# Patient Record
Sex: MALE | Race: WHITE | NOT HISPANIC OR LATINO | Employment: FULL TIME | URBAN - NONMETROPOLITAN AREA
[De-identification: names, ages, dates, MRNs, and addresses within clinical notes are randomized per-mention and may not be internally consistent; named-entity substitution may affect disease eponyms.]

---

## 2018-02-08 ENCOUNTER — OFFICE VISIT (OUTPATIENT)
Dept: MEDICAL GROUP | Facility: PHYSICIAN GROUP | Age: 53
End: 2018-02-08
Payer: COMMERCIAL

## 2018-02-08 VITALS
RESPIRATION RATE: 12 BRPM | HEIGHT: 68 IN | OXYGEN SATURATION: 97 % | TEMPERATURE: 98 F | DIASTOLIC BLOOD PRESSURE: 80 MMHG | HEART RATE: 90 BPM | WEIGHT: 219 LBS | SYSTOLIC BLOOD PRESSURE: 138 MMHG | BODY MASS INDEX: 33.19 KG/M2

## 2018-02-08 DIAGNOSIS — Z12.11 SCREEN FOR COLON CANCER: ICD-10-CM

## 2018-02-08 DIAGNOSIS — Z80.0 FAMILY HISTORY OF ESOPHAGEAL CANCER: ICD-10-CM

## 2018-02-08 DIAGNOSIS — E66.9 OBESITY (BMI 30-39.9): ICD-10-CM

## 2018-02-08 DIAGNOSIS — D17.1 LIPOMA OF BACK: ICD-10-CM

## 2018-02-08 DIAGNOSIS — Z13.220 SCREENING FOR LIPID DISORDERS: ICD-10-CM

## 2018-02-08 DIAGNOSIS — K21.9 CHRONIC GERD: ICD-10-CM

## 2018-02-08 DIAGNOSIS — R00.2 PALPITATION: Primary | ICD-10-CM

## 2018-02-08 PROCEDURE — 99204 OFFICE O/P NEW MOD 45 MIN: CPT | Performed by: NURSE PRACTITIONER

## 2018-02-08 RX ORDER — CHOLECALCIFEROL (VITAMIN D3) 25 MCG
TABLET,CHEWABLE ORAL
COMMUNITY

## 2018-02-08 RX ORDER — MULTIVIT WITH MINERALS/LUTEIN
TABLET ORAL
COMMUNITY

## 2018-02-08 ASSESSMENT — PATIENT HEALTH QUESTIONNAIRE - PHQ9: CLINICAL INTERPRETATION OF PHQ2 SCORE: 0

## 2018-02-08 NOTE — ASSESSMENT & PLAN NOTE
Patient has a large lipoma on the left upper back, this does not cause him pain, it has not increased in size. He may discuss with general surgery if he wishes to have it surgically removed. If it increases in size recommend US.

## 2018-02-08 NOTE — ASSESSMENT & PLAN NOTE
Patient is a 52-year-old male here to establish care. He complains of palpitations in the evening, episodes generally last more than 30 minutes. They're not accompanied with chest pain, shortness of breath, syncope. He does have some anxiety as his brother was recently diagnosed with esophageal cancer. He denies any association between his anxiety and palpitation episodes. He does not drink alcohol. He does not smoke. He does drink one cup of coffee a day, occasionally iced tea, caffeine does not seem to be an aggravating factor. We did discuss that these are likely benign PVCs, trial metoprolol 25 mg at night. Patient is quite anxious about this and would like to move forward with workup. We will inform EKG, check TSH, CBC, electrolytes. Rule out structural heart disease with echo. 48 hour Holter monitor ordered. Follow-up in one month, sooner if needed.

## 2018-02-08 NOTE — PROGRESS NOTES
[facility]  Primary Care Office Visit - Problem-Oriented        History:     Vitaly Paulino is a 52 y.o. male who is here today to discuss Hypertension (est care, wants screened for diabetes) and Back Pain (lump on back)      Family history of esophageal cancer  As previously noted, patient is a 52-year-old male who is new to me and here to establish care. He does have a family history of esophageal cancer in both his father and brother. He is no longer drinking alcohol or smoking. He continues on omeprazole 20 mg twice a day. He has had no odynophagia, dysphagia, hoarseness of the voice or change in the voice.     Palpitation  Patient is a 52-year-old male here to establish care. He complains of palpitations in the evening, episodes generally last more than 30 minutes. They're not accompanied with chest pain, shortness of breath, syncope. He does have some anxiety as his brother was recently diagnosed with esophageal cancer. He denies any association between his anxiety and palpitation episodes. He does not drink alcohol. He does not smoke. He does drink one cup of coffee a day, occasionally iced tea, caffeine does not seem to be an aggravating factor. We did discuss that these are likely benign PVCs, trial metoprolol 25 mg at night. Patient is quite anxious about this and would like to move forward with workup. We will inform EKG, check TSH, CBC, electrolytes. Rule out structural heart disease with echo. 48 hour Holter monitor ordered. Follow-up in one month, sooner if needed.    Chronic GERD  Patient is a 52-year-old male with chronic GERD treated with omeprazole 20 mg twice a day. He is no longer smoking or drinking alcohol. He does have a family history of esophageal cancer in both his brother and father. We did discuss the major risk factors for esophageal cancer, very unlikely that there is a familial component as though he is at risk for Julian's esophagus due to chronic GERD and we will refer him  to general surgery for both EGD and colonoscopy.     Lipoma of back  Patient has a large lipoma on the left upper back, this does not cause him pain, it has not increased in size. He may discuss with general surgery if he wishes to have it surgically removed. If it increases in size recommend US.         History reviewed. No pertinent past medical history.  History reviewed. No pertinent surgical history.  Social History     Social History   • Marital status:      Spouse name: N/A   • Number of children: N/A   • Years of education: N/A     Occupational History   • Not on file.     Social History Main Topics   • Smoking status: Former Smoker     Packs/day: 1.00     Years: 20.00     Types: Cigarettes     Quit date: 2/8/2006   • Smokeless tobacco: Former User   • Alcohol use No   • Drug use: No   • Sexual activity: Not on file     Other Topics Concern   • Not on file     Social History Narrative   • No narrative on file     History   Smoking Status   • Former Smoker   • Packs/day: 1.00   • Years: 20.00   • Types: Cigarettes   • Quit date: 2/8/2006   Smokeless Tobacco   • Former User     Family History   Problem Relation Age of Onset   • Cancer Mother      BCA    • Cancer Father      esophageal and stomach    • Cancer Brother      barretts, esophageal CA @ 61     No Known Allergies    Problem List:     Patient Active Problem List    Diagnosis Date Noted   • Family history of esophageal cancer 02/08/2018   • Palpitation 02/08/2018   • Obesity (BMI 30-39.9) 02/08/2018   • Chronic GERD 02/08/2018   • Lipoma of back 02/08/2018         Medications:     Current Outpatient Prescriptions:   •  aspirin 81 MG tablet, Take 81 mg by mouth every day., Disp: , Rfl:   •  Cyanocobalamin (B-12) 2500 MCG Tab, Take  by mouth., Disp: , Rfl:   •  Ascorbic Acid (VITAMIN C) 1000 MG Tab, Take  by mouth., Disp: , Rfl:   •  Lansoprazole (PREVACID PO), Take  by mouth., Disp: , Rfl:   •  metoprolol (LOPRESSOR) 25 MG Tab, Take 1 Tab by mouth  "2 times a day., Disp: 60 Tab, Rfl: 0  •  Multiple Vitamins-Minerals (MULTIVITAMIN PO), Take  by mouth., Disp: , Rfl:       Review of Systems:     Pertinent positives as per HPI, all other systems reviewed and WNL     Physical Assessment:     VS: /80   Pulse 90   Temp 36.7 °C (98 °F)   Resp 12   Ht 1.715 m (5' 7.5\")   Wt 99.3 kg (219 lb)   SpO2 97%   BMI 33.79 kg/m²     General: Well-developed, well-nourished male, obese     Head: PERRL, EOMI. Normocephalic. No facial asymmetry noted.  Cardiovasc: RRR, no MRG. No thrills or bruits. Pulses 2+ and symmetric at all distal extremities.  Pulmonary: Lungs clear bilaterally.  Normal respiratory effort. No wheeze or crackles.   Neuro: Alert and oriented, CNs II-XII intact, no focal deficits.  Skin: Large lipoma noted on left upper back, roughly the size of a golf ball. No rashes noted. Skin warm, dry, intact    Psych: Dressed appropriately for the weather, pleasant and conversant.  Affect, mood & judgment appropriate.  Assessment/Plan:   Vitaly was seen today for hypertension and back pain.    Diagnoses and all orders for this visit:    Palpitation, uncontrolled   - Reassurance provided, likely PVCs, trial metoprolol 25 mg at night, may use during the day if experiencing symptomatic episodes. We will move forward with workup as below. He will follow up in one month, sooner if needed. ER precautions discussed.  -     ECHOCARDIOGRAM COMP W/O CONT; Future  -     TSH; Future  -     FREE THYROXINE; Future  -     CBC WITH DIFFERENTIAL; Future  -     COMP METABOLIC PANEL; Future  -     EKG; Future - NSR, no ectopy   -     metoprolol (LOPRESSOR) 25 MG Tab; Take 1 Tab by mouth 2 times a day.  -     HOLTER - Cardiology Performed (48HR); Future    Family history of esophageal cancer  - discussed referral to gastro, patient declines, will discuss with Dr. Khurram rubin surgery to see if EGD is appropriate at the time of colonoscopy.     Chronic GERD, stable on daily PPI  - " discussed referral to gastro, patient declines, will discuss with Dr. Paulson gen surgery to see if EGD is appropriate at the time of colonoscopy.     Screen colon cancer  - referral for colonoscopy - Dr. Paulson     Screening for lipid disorders  -     LIPID PROFILE; Future    Obesity (BMI 30-39.9)  -     Patient identified as having weight management issue.  Appropriate orders and counseling given.    Lipoma of back, stable, continue to monitor, discuss with general surgery       Patient is agreeable to the above plan and voiced understanding. All questions answered.     Please note that this dictation was created using voice recognition software. I have made every reasonable attempt to correct obvious errors, but I expect that there are errors of grammar and possibly content that I did not discover before finalizing the note.      VITO Morris  2/8/2018, 10:36 AM

## 2018-02-08 NOTE — PATIENT INSTRUCTIONS
Metoprolol extended-release tablets  What is this medicine?  METOPROLOL (me TOE proe lole) is a beta-blocker. Beta-blockers reduce the workload on the heart and help it to beat more regularly. This medicine is used to treat high blood pressure and to prevent chest pain. It is also used to after a heart attack and to prevent an additional heart attack from occurring.  This medicine may be used for other purposes; ask your health care provider or pharmacist if you have questions.  COMMON BRAND NAME(S): Toprol XL  What should I tell my health care provider before I take this medicine?  They need to know if you have any of these conditions:  -diabetes  -heart or vessel disease like slow heart rate, worsening heart failure, heart block, sick sinus syndrome or Raynaud's disease  -kidney disease  -liver disease  -lung or breathing disease, like asthma or emphysema  -pheochromocytoma  -thyroid disease  -an unusual or allergic reaction to metoprolol, other beta-blockers, medicines, foods, dyes, or preservatives  -pregnant or trying to get pregnant  -breast-feeding  How should I use this medicine?  Take this medicine by mouth with a glass of water. Follow the directions on the prescription label. Do not crush or chew. Take this medicine with or immediately after meals. Take your doses at regular intervals. Do not take more medicine than directed. Do not stop taking this medicine suddenly. This could lead to serious heart-related effects.  Talk to your pediatrician regarding the use of this medicine in children. While this drug may be prescribed for children as young as 6 years for selected conditions, precautions do apply.  Overdosage: If you think you have taken too much of this medicine contact a poison control center or emergency room at once.  NOTE: This medicine is only for you. Do not share this medicine with others.  What if I miss a dose?  If you miss a dose, take it as soon as you can. If it is almost time for your  next dose, take only that dose. Do not take double or extra doses.  What may interact with this medicine?  Do not take this medicine with any of the following medications:  -sotalol  This medicine may also interact with the following medications:  -clonidine  -digoxin  -dobutamine  -epinephrine  -isoproterenol  -medicine to control heart rhythm like quinidine, propafenone  -medicine for depression like monoamine oxidase (MAO) inhibitors, fluoxetine, and paroxetine  -medicine for blood pressure like calcium channel blockers  -reserpine  This list may not describe all possible interactions. Give your health care provider a list of all the medicines, herbs, non-prescription drugs, or dietary supplements you use. Also tell them if you smoke, drink alcohol, or use illegal drugs. Some items may interact with your medicine.  What should I watch for while using this medicine?  Visit your doctor or health care professional for regular check ups. Contact your doctor right away if your symptoms worsen. Check your blood pressure and pulse rate regularly. Ask your health care professional what your blood pressure and pulse rate should be, and when you should contact them.  You may get drowsy or dizzy. Do not drive, use machinery, or do anything that needs mental alertness until you know how this medicine affects you. Do not sit or stand up quickly, especially if you are an older patient. This reduces the risk of dizzy or fainting spells. Contact your doctor if these symptoms continue. Alcohol may interfere with the effect of this medicine. Avoid alcoholic drinks.  What side effects may I notice from receiving this medicine?  Side effects that you should report to your doctor or health care professional as soon as possible:  -allergic reactions like skin rash, itching or hives  -cold or numb hands or feet  -depression  -difficulty breathing  -faint  -fever with sore throat  -irregular heartbeat, chest pain  -rapid weight  gain  -swollen legs or ankles  Side effects that usually do not require medical attention (report to your doctor or health care professional if they continue or are bothersome):  -anxiety or nervousness  -change in sex drive or performance  -dry skin  -headache  -nightmares or trouble sleeping  -short term memory loss  -stomach upset or diarrhea  -unusually tired  This list may not describe all possible side effects. Call your doctor for medical advice about side effects. You may report side effects to FDA at 5-775-DZH-2224.  Where should I keep my medicine?  Keep out of the reach of children.  Store at room temperature between 15 and 30 degrees C (59 and 86 degrees F). Throw away any unused medicine after the expiration date.  NOTE: This sheet is a summary. It may not cover all possible information. If you have questions about this medicine, talk to your doctor, pharmacist, or health care provider.  © 2014, [employer]. (2/27/2009 5:08:10 PM)

## 2018-02-08 NOTE — ASSESSMENT & PLAN NOTE
As previously noted, patient is a 52-year-old male who is new to me and here to establish care. He does have a family history of esophageal cancer in both his father and brother. He is no longer drinking alcohol or smoking. He continues on omeprazole 20 mg twice a day. He has had no odynophagia, dysphagia, hoarseness of the voice or change in the voice.

## 2018-02-08 NOTE — ASSESSMENT & PLAN NOTE
Patient is a 52-year-old male with chronic GERD treated with omeprazole 20 mg twice a day. He is no longer smoking or drinking alcohol. He does have a family history of esophageal cancer in both his brother and father. We did discuss the major risk factors for esophageal cancer, very unlikely that there is a familial component as though he is at risk for Julian's esophagus due to chronic GERD and we will refer him to general surgery for both EGD and colonoscopy.

## 2018-02-21 ENCOUNTER — TELEPHONE (OUTPATIENT)
Dept: MEDICAL GROUP | Facility: PHYSICIAN GROUP | Age: 53
End: 2018-02-21

## 2018-02-21 NOTE — TELEPHONE ENCOUNTER
Called [facility] for a prior auth for an Echo to be completed at [facility]. They will be sending a fax for more information. Tracking number is [id number]. We will fax papers back to [phone]

## 2018-03-08 ENCOUNTER — OFFICE VISIT (OUTPATIENT)
Dept: MEDICAL GROUP | Facility: PHYSICIAN GROUP | Age: 53
End: 2018-03-08
Payer: COMMERCIAL

## 2018-03-08 VITALS
HEART RATE: 87 BPM | BODY MASS INDEX: 33.1 KG/M2 | TEMPERATURE: 97.8 F | SYSTOLIC BLOOD PRESSURE: 110 MMHG | HEIGHT: 68 IN | RESPIRATION RATE: 16 BRPM | DIASTOLIC BLOOD PRESSURE: 60 MMHG | WEIGHT: 218.4 LBS | OXYGEN SATURATION: 97 %

## 2018-03-08 DIAGNOSIS — Z23 NEED FOR VACCINATION: ICD-10-CM

## 2018-03-08 DIAGNOSIS — R00.2 PALPITATION: ICD-10-CM

## 2018-03-08 DIAGNOSIS — Z80.0 FAMILY HISTORY OF ESOPHAGEAL CANCER: ICD-10-CM

## 2018-03-08 DIAGNOSIS — K21.9 CHRONIC GERD: ICD-10-CM

## 2018-03-08 PROCEDURE — 90715 TDAP VACCINE 7 YRS/> IM: CPT | Performed by: NURSE PRACTITIONER

## 2018-03-08 PROCEDURE — 90471 IMMUNIZATION ADMIN: CPT | Performed by: NURSE PRACTITIONER

## 2018-03-08 PROCEDURE — 99214 OFFICE O/P EST MOD 30 MIN: CPT | Mod: 25 | Performed by: NURSE PRACTITIONER

## 2018-03-08 NOTE — PROGRESS NOTES
[facility]  Primary Care Office Visit - Problem-Oriented        History:     Vitaly Paulino is a 52 y.o. male who is here today to discuss Hypertension ([facility] labs)      Palpitation  Patient is a 52-year-old male with ongoing palpitations. Echo was reviewed with the patient today and heart structures are normal, PVCs were captured during the exam. He was started on metoprolol 25 mg twice daily at our last office visit, he does not take this during the day as the palpitations do not seem to be bothersome during the day. He takes 25 mg at night as this is when they are most bothersome, he does report that his episodes seem less frequent and less prolonged. EKG, TSH, CBC, electrolytes were normal. We will have him trial 50 mg nightly, monitoring his heart rate and pulse, follow-up parameters were discussed. Should his symptoms remain stable and 50 mg metoprolol nightly he will follow-up in 6 months.    Patient has colonoscopy and EGD scheduled on 3/15/18 - Dr. Paulson       History reviewed. No pertinent past medical history.  History reviewed. No pertinent surgical history.  Social History     Social History   • Marital status:      Spouse name: N/A   • Number of children: N/A   • Years of education: N/A     Occupational History   • Not on file.     Social History Main Topics   • Smoking status: Former Smoker     Packs/day: 1.00     Years: 20.00     Types: Cigarettes     Quit date: 2/8/2006   • Smokeless tobacco: Former User   • Alcohol use No   • Drug use: No   • Sexual activity: Not on file     Other Topics Concern   • Not on file     Social History Narrative   • No narrative on file     History   Smoking Status   • Former Smoker   • Packs/day: 1.00   • Years: 20.00   • Types: Cigarettes   • Quit date: 2/8/2006   Smokeless Tobacco   • Former User     Family History   Problem Relation Age of Onset   • Cancer Mother      BCA    • Cancer Father      esophageal and stomach    • Cancer Brother      barretts,  "esophageal CA @ 61     No Known Allergies    Problem List:     Patient Active Problem List    Diagnosis Date Noted   • Family history of esophageal cancer 02/08/2018   • Palpitation 02/08/2018   • Obesity (BMI 30-39.9) 02/08/2018   • Chronic GERD 02/08/2018   • Lipoma of back 02/08/2018         Medications:     Current Outpatient Prescriptions:   •  aspirin 81 MG tablet, Take 81 mg by mouth every day., Disp: , Rfl:   •  Cyanocobalamin (B-12) 2500 MCG Tab, Take  by mouth., Disp: , Rfl:   •  Ascorbic Acid (VITAMIN C) 1000 MG Tab, Take  by mouth., Disp: , Rfl:   •  Lansoprazole (PREVACID PO), Take  by mouth., Disp: , Rfl:   •  metoprolol (LOPRESSOR) 25 MG Tab, Take 1 Tab by mouth 2 times a day., Disp: 60 Tab, Rfl: 0  •  Multiple Vitamins-Minerals (MULTIVITAMIN PO), Take  by mouth., Disp: , Rfl:       Review of Systems:     Pertinent positives as per HPI, all other systems reviewed and WNL    Physical Assessment:     VS: /60   Pulse 87   Temp 36.6 °C (97.8 °F)   Resp 16   Ht 1.715 m (5' 7.52\")   Wt 99.1 kg (218 lb 6.4 oz)   SpO2 97%   BMI 33.68 kg/m²     General: Well-developed, well-nourished, male, obese body habitus     Head: PERRL, EOMI. Normocephalic. No facial asymmetry noted.  Cardiovasc: RRR, no MRG. No thrills or bruits. Pulses 2+ and symmetric at all distal extremities.  Pulmonary: Lungs clear bilaterally.  Normal respiratory effort. No wheeze or crackles.   Neuro: Alert and oriented  Skin:No rashes noted. Skin warm, dry, intact    Psych: Dressed appropriately for the weather, pleasant and conversant.  Affect, mood & judgment appropriate.    Assessment/Plan:   Vitaly was seen today for hypertension.    Diagnoses and all orders for this visit:    Palpitation, ongoing, improved   - Reviewed echo and labs with the patient, reassurance provided, benign PVCs, increase metoprolol to 50 mg nightly, follow-up in 6 months, sooner if symptoms remain uncontrolled with increase BB.    Need for vaccination  -   "   TDAP VACCINE =>8YO IM      Patient is agreeable to the above plan and voiced understanding. All questions answered.     Please note that this dictation was created using voice recognition software. I have made every reasonable attempt to correct obvious errors, but I expect that there are errors of grammar and possibly content that I did not discover before finalizing the note.      VITO Morris  3/8/2018, 9:26 AM

## 2018-03-08 NOTE — ASSESSMENT & PLAN NOTE
Patient is a 52-year-old male with ongoing palpitations. Echo was reviewed with the patient today and heart structures are normal, PVCs were captured during the exam. He was started on metoprolol 25 mg twice daily at our last office visit, he does not take this during the day as the palpitations do not seem to be bothersome during the day. He takes 25 mg at night as this is when they are most bothersome, he does report that his episodes seem less frequent and less prolonged. EKG, TSH, CBC, electrolytes were normal. We will have him trial 50 mg nightly, monitoring his heart rate and pulse, follow-up parameters were discussed. Should his symptoms remain stable and 50 mg metoprolol nightly he will follow-up in 6 months.

## 2018-03-19 DIAGNOSIS — R00.2 PALPITATION: ICD-10-CM

## 2018-03-20 NOTE — TELEPHONE ENCOUNTER
Refill X 6 months, sent to pharmacy.Pt. Seen in the last 6 months per protocol. CMP scanned in from [facility] (3/18) and [facility].

## 2018-03-20 NOTE — TELEPHONE ENCOUNTER
Was the patient seen in the last year in this department? Yes     Does patient have an active prescription for medications requested? No     Received Request Via: Pharmacy      Pt met protocol?: Yes pt last ov 3/18   BP Readings from Last 1 Encounters:   03/08/18 110/60

## 2018-06-04 ENCOUNTER — PATIENT MESSAGE (OUTPATIENT)
Dept: MEDICAL GROUP | Facility: PHYSICIAN GROUP | Age: 53
End: 2018-06-04

## 2018-06-04 DIAGNOSIS — R00.2 PALPITATION: ICD-10-CM

## 2018-06-05 RX ORDER — METOPROLOL TARTRATE 50 MG/1
50 TABLET, FILM COATED ORAL 2 TIMES DAILY
Qty: 60 TAB | Refills: 2 | Status: SHIPPED | OUTPATIENT
Start: 2018-06-05 | End: 2018-09-10

## 2018-09-10 ENCOUNTER — OFFICE VISIT (OUTPATIENT)
Dept: MEDICAL GROUP | Facility: PHYSICIAN GROUP | Age: 53
End: 2018-09-10
Payer: COMMERCIAL

## 2018-09-10 VITALS
HEART RATE: 68 BPM | DIASTOLIC BLOOD PRESSURE: 74 MMHG | OXYGEN SATURATION: 97 % | RESPIRATION RATE: 16 BRPM | TEMPERATURE: 98.5 F | HEIGHT: 68 IN | SYSTOLIC BLOOD PRESSURE: 122 MMHG | WEIGHT: 215 LBS | BODY MASS INDEX: 32.58 KG/M2

## 2018-09-10 DIAGNOSIS — D17.1 LIPOMA OF BACK: ICD-10-CM

## 2018-09-10 DIAGNOSIS — E66.9 OBESITY (BMI 30-39.9): ICD-10-CM

## 2018-09-10 DIAGNOSIS — R00.2 PALPITATION: ICD-10-CM

## 2018-09-10 PROCEDURE — 99214 OFFICE O/P EST MOD 30 MIN: CPT | Performed by: NURSE PRACTITIONER

## 2018-09-10 RX ORDER — METOPROLOL SUCCINATE 100 MG/1
100 TABLET, EXTENDED RELEASE ORAL DAILY
Qty: 90 TAB | Refills: 1 | Status: SHIPPED | OUTPATIENT
Start: 2018-09-10 | End: 2018-12-19 | Stop reason: SDUPTHER

## 2018-09-10 NOTE — PROGRESS NOTES
[facility]  Primary Care Office Visit - Problem-Oriented        History:     Vitaly Paulino is a 53 y.o. male who is here today to discuss Medication Refill      Palpitation  Patient is a 53-year-old male with palpitations.  Echo was performed and normal.  PVCs were captured during the exam.  TSH, CBC, CMP, EKG were also normal.  He continues on metoprolol 50 mg twice daily.  He does report that in the early evening before bed he does have mild episodes of palpitations every other night.  He has no syncope, shortness of breath, chest pain with the episodes.  His wife accompanies the visit today, she denies witnessed apneic episodes, snoring.  Patient denies feeling tired when he wakes, symptoms to suggest underlying obstructive sleep apnea.  Overall he feels well, declines referral to cardiology at this time.    Lipoma of back  Patient is a 53-year-old male with large lipoma of the left upper back, he has had surgical excision with Dr. Paulson.        No past medical history on file.  No past surgical history on file.  Social History     Social History   • Marital status:      Spouse name: N/A   • Number of children: N/A   • Years of education: N/A     Occupational History   • Not on file.     Social History Main Topics   • Smoking status: Former Smoker     Packs/day: 1.00     Years: 20.00     Types: Cigarettes     Quit date: 2/8/2006   • Smokeless tobacco: Former User   • Alcohol use No   • Drug use: No   • Sexual activity: Not Currently     Other Topics Concern   • Not on file     Social History Narrative   • No narrative on file     History   Smoking Status   • Former Smoker   • Packs/day: 1.00   • Years: 20.00   • Types: Cigarettes   • Quit date: 2/8/2006   Smokeless Tobacco   • Former User     Family History   Problem Relation Age of Onset   • Cancer Mother         BCA    • Cancer Father         esophageal and stomach    • Cancer Brother         barretts, esophageal CA @ 61     No Known  "Allergies    Problem List:     Patient Active Problem List    Diagnosis Date Noted   • Family history of esophageal cancer 02/08/2018   • Palpitation 02/08/2018   • Obesity (BMI 30-39.9) 02/08/2018   • Chronic GERD 02/08/2018         Medications:     Current Outpatient Prescriptions:   •  metoprolol SR (TOPROL XL) 100 MG TABLET SR 24 HR, Take 1 Tab by mouth every day., Disp: 90 Tab, Rfl: 1  •  aspirin 81 MG tablet, Take 81 mg by mouth every day., Disp: , Rfl:   •  Cyanocobalamin (B-12) 2500 MCG Tab, Take  by mouth., Disp: , Rfl:   •  Ascorbic Acid (VITAMIN C) 1000 MG Tab, Take  by mouth., Disp: , Rfl:   •  Lansoprazole (PREVACID PO), Take  by mouth., Disp: , Rfl:   •  Multiple Vitamins-Minerals (MULTIVITAMIN PO), Take  by mouth., Disp: , Rfl:       Review of Systems:     Pertinent positives as per HPI, all other systems reviewed and WNL     Physical Assessment:     VS: /74   Pulse 68   Temp 36.9 °C (98.5 °F)   Resp 16   Ht 1.715 m (5' 7.52\")   Wt 97.5 kg (215 lb)   SpO2 97%   BMI 33.16 kg/m²     General: Well-developed, well-nourished, male     Head: PERRL, EOMI. Normocephalic. No facial asymmetry noted.  Cardiovasc:No JVD.  RRR, no MRG. No thrills or bruits. Pulses 2+ and symmetric at all distal extremities.  Pulmonary: Lungs clear bilaterally.  Normal respiratory effort. No wheeze or crackles.   Neuro: Alert and oriented, CNs II-XII intact, no focal deficits.  Skin:No rashes noted. Skin warm, dry, intact    Psych: Dressed appropriately for the weather, pleasant and conversant.  Affect, mood & judgment appropriate.    Assessment/Plan:   Vitaly was seen today for medication refill.    Diagnoses and all orders for this visit:    Palpitation, somewhat uncontrolled   -Trial metoprolol  mg daily, follow-up in 3 months, sooner if needed.  Offered referral to cardiology for second opinion, patient declines.  ER precautions discussed  -     metoprolol SR (TOPROL XL) 100 MG TABLET SR 24 HR; Take 1 Tab by " mouth every day.    Lipoma of back, resolved, surgical excision with general surgery    Obesity (BMI 30-39.9)  -     Patient identified as having weight management issue.  Appropriate orders and counseling given.    Annual labs in March     Patient is agreeable to the above plan and voiced understanding. All questions answered.     Please note that this dictation was created using voice recognition software. I have made every reasonable attempt to correct obvious errors, but I expect that there are errors of grammar and possibly content that I did not discover before finalizing the note.      VITO Morris  9/10/2018, 9:59 AM

## 2018-09-10 NOTE — ASSESSMENT & PLAN NOTE
Patient is a 53-year-old male with palpitations.  Echo was performed and normal.  PVCs were captured during the exam.  TSH, CBC, CMP, EKG were also normal.  He continues on metoprolol 50 mg twice daily.  He does report that in the early evening before bed he does have mild episodes of palpitations every other night.  He has no syncope, shortness of breath, chest pain with the episodes.  His wife accompanies the visit today, she denies witnessed apneic episodes, snoring.  Patient denies feeling tired when he wakes, symptoms to suggest underlying obstructive sleep apnea.  Overall he feels well, declines referral to cardiology at this time.

## 2018-09-10 NOTE — ASSESSMENT & PLAN NOTE
Patient is a 53-year-old male with large lipoma of the left upper back, he has had surgical excision with Dr. Paulson.

## 2018-12-19 ENCOUNTER — OFFICE VISIT (OUTPATIENT)
Dept: MEDICAL GROUP | Facility: PHYSICIAN GROUP | Age: 53
End: 2018-12-19
Payer: COMMERCIAL

## 2018-12-19 VITALS
WEIGHT: 216 LBS | OXYGEN SATURATION: 96 % | HEART RATE: 90 BPM | DIASTOLIC BLOOD PRESSURE: 82 MMHG | HEIGHT: 68 IN | BODY MASS INDEX: 32.74 KG/M2 | SYSTOLIC BLOOD PRESSURE: 130 MMHG | TEMPERATURE: 97.8 F

## 2018-12-19 DIAGNOSIS — K21.9 CHRONIC GERD: ICD-10-CM

## 2018-12-19 DIAGNOSIS — R00.2 PALPITATION: ICD-10-CM

## 2018-12-19 DIAGNOSIS — Z13.220 SCREENING FOR LIPID DISORDERS: ICD-10-CM

## 2018-12-19 PROCEDURE — 99214 OFFICE O/P EST MOD 30 MIN: CPT | Performed by: NURSE PRACTITIONER

## 2018-12-19 RX ORDER — OMEPRAZOLE 20 MG/1
20 CAPSULE, DELAYED RELEASE ORAL DAILY
COMMUNITY

## 2018-12-19 RX ORDER — METOPROLOL SUCCINATE 100 MG/1
100 TABLET, EXTENDED RELEASE ORAL DAILY
Qty: 90 TAB | Refills: 1 | Status: SHIPPED | OUTPATIENT
Start: 2018-12-19 | End: 2019-05-09 | Stop reason: SDUPTHER

## 2018-12-19 NOTE — ASSESSMENT & PLAN NOTE
Patient is a 53-year-old male with palpitations here for follow-up.  He has had echocardiogram which was normal in 2018, during the echo PVCs were captured.  TSH, CBC, CMP, EKG were also done as part of his workup and normal.  He has been doing well on metoprolol  mg daily. No AE. Needs refill.

## 2018-12-19 NOTE — PROGRESS NOTES
[facility]  Primary Care Office Visit - Problem-Oriented        History:     Vitaly Paulino is a 53 y.o. male who is here today to discuss Medication Refill (Metroprolol refill) and Follow-Up (6 month FV)      Palpitation  Patient is a 53-year-old male with palpitations here for follow-up.  He has had echocardiogram which was normal in 2018, during the echo PVCs were captured.  TSH, CBC, CMP, EKG were also done as part of his workup and normal.  He has been doing well on metoprolol  mg daily. No AE. Needs refill.     Chronic GERD  This is a chronic condition which is well controlled on medications. Patient is tolerating medications without side effects.          History reviewed. No pertinent past medical history.  History reviewed. No pertinent surgical history.  Social History     Social History   • Marital status:      Spouse name: N/A   • Number of children: N/A   • Years of education: N/A     Occupational History   • Not on file.     Social History Main Topics   • Smoking status: Former Smoker     Packs/day: 1.00     Years: 20.00     Types: Cigarettes     Quit date: 2/8/2006   • Smokeless tobacco: Former User   • Alcohol use No   • Drug use: No   • Sexual activity: Not Currently     Other Topics Concern   • Not on file     Social History Narrative   • No narrative on file     History   Smoking Status   • Former Smoker   • Packs/day: 1.00   • Years: 20.00   • Types: Cigarettes   • Quit date: 2/8/2006   Smokeless Tobacco   • Former User     Family History   Problem Relation Age of Onset   • Cancer Mother         BCA    • Cancer Father         esophageal and stomach    • Cancer Brother         barretts, esophageal CA @ 61     No Known Allergies    Problem List:     Patient Active Problem List    Diagnosis Date Noted   • Family history of esophageal cancer 02/08/2018   • Palpitation 02/08/2018   • Obesity (BMI 30-39.9) 02/08/2018   • Chronic GERD 02/08/2018         Medications:     Current  "Outpatient Prescriptions:   •  omeprazole (PRILOSEC) 20 MG delayed-release capsule, Take 20 mg by mouth every day., Disp: , Rfl:   •  metoprolol SR (TOPROL XL) 100 MG TABLET SR 24 HR, Take 1 Tab by mouth every day., Disp: 90 Tab, Rfl: 1  •  aspirin 81 MG tablet, Take 81 mg by mouth every day., Disp: , Rfl:   •  Cyanocobalamin (B-12) 2500 MCG Tab, Take  by mouth., Disp: , Rfl:   •  Ascorbic Acid (VITAMIN C) 1000 MG Tab, Take  by mouth., Disp: , Rfl:   •  Multiple Vitamins-Minerals (MULTIVITAMIN PO), Take  by mouth., Disp: , Rfl:   •  Lansoprazole (PREVACID PO), Take  by mouth., Disp: , Rfl:       Review of Systems:     Pertinent positives as per HPI, all other systems reviewed and WNL   Physical Assessment:     VS: /82   Pulse 90   Temp 36.6 °C (97.8 °F)   Ht 1.715 m (5' 7.52\")   Wt 98 kg (216 lb)   SpO2 96%   BMI 33.31 kg/m²     General: Well-developed, well-nourished, male, obese    Head: PERRL, EOMI. Normocephalic. No facial asymmetry noted.  Cardiovasc: RRR, no MRG. No thrills or bruits. Pulses 2+ and symmetric at all distal extremities.  Pulmonary: Lungs clear bilaterally.  Normal respiratory effort. No wheeze or crackles.   Neuro: Alert and oriented, CNs II-XII intact, no focal deficits.  Skin:No rashes noted. Skin warm, dry, intact    Psych: Dressed appropriately for the weather, pleasant and conversant.  Affect, mood & judgment appropriate.    Assessment/Plan:   Vitaly was seen today for medication refill and follow-up.    Diagnoses and all orders for this visit:    Palpitation, chronic, well controlled   -Continue current treatment, follow-up in 6 months, sooner if needed  -     metoprolol SR (TOPROL XL) 100 MG TABLET SR 24 HR; Take 1 Tab by mouth every day.    Chronic GERD, chronic, well controlled  -Continue current treatment, monitor.    Screening for lipid disorders  -     Lipid Profile; Future  -     COMP METABOLIC PANEL; Future      Patient is agreeable to the above plan and voiced " understanding. All questions answered.     Please note that this dictation was created using voice recognition software. I have made every reasonable attempt to correct obvious errors, but I expect that there are errors of grammar and possibly content that I did not discover before finalizing the note.      VITO Morris  12/19/2018, 3:01 PM

## 2019-05-09 ENCOUNTER — OFFICE VISIT (OUTPATIENT)
Dept: MEDICAL GROUP | Facility: PHYSICIAN GROUP | Age: 54
End: 2019-05-09
Payer: COMMERCIAL

## 2019-05-09 VITALS
OXYGEN SATURATION: 96 % | HEART RATE: 84 BPM | RESPIRATION RATE: 14 BRPM | SYSTOLIC BLOOD PRESSURE: 118 MMHG | WEIGHT: 215 LBS | BODY MASS INDEX: 32.58 KG/M2 | HEIGHT: 68 IN | TEMPERATURE: 97.8 F | DIASTOLIC BLOOD PRESSURE: 90 MMHG

## 2019-05-09 DIAGNOSIS — K21.9 CHRONIC GERD: ICD-10-CM

## 2019-05-09 DIAGNOSIS — R73.01 ELEVATED FASTING GLUCOSE: ICD-10-CM

## 2019-05-09 DIAGNOSIS — R00.2 PALPITATION: ICD-10-CM

## 2019-05-09 PROCEDURE — 99214 OFFICE O/P EST MOD 30 MIN: CPT | Performed by: NURSE PRACTITIONER

## 2019-05-09 RX ORDER — METOPROLOL SUCCINATE 100 MG/1
100 TABLET, EXTENDED RELEASE ORAL DAILY
Qty: 90 TAB | Refills: 3 | Status: SHIPPED | OUTPATIENT
Start: 2019-05-09 | End: 2019-10-31 | Stop reason: SDUPTHER

## 2019-05-09 RX ORDER — AMOXICILLIN 500 MG/1
500 CAPSULE ORAL 3 TIMES DAILY
COMMUNITY
End: 2019-10-31

## 2019-05-09 NOTE — PROGRESS NOTES
[facility]  Primary Care Office Visit - Problem-Oriented        History:     Vitaly Paulino is a 54 y.o. male who is here today to discuss Medication Refill (all meds need refilled ) and Results (lab results )      Palpitation  Patient is a 53-year-old male with palpitations here for follow-up.  He has had echocardiogram which was normal in 2018, during the echo PVCs were captured.  TSH, CBC, CMP, EKG were also done as part of his workup and normal.  He has been doing well on metoprolol  mg daily. No AE. Needs refill.     Chronic GERD  This is a chronic condition which is well controlled on medications. Patient is tolerating medications without side effects.          History reviewed. No pertinent past medical history.  History reviewed. No pertinent surgical history.  Social History     Social History   • Marital status:      Spouse name: N/A   • Number of children: N/A   • Years of education: N/A     Occupational History   • Not on file.     Social History Main Topics   • Smoking status: Former Smoker     Packs/day: 1.00     Years: 20.00     Types: Cigarettes     Quit date: 2/8/2006   • Smokeless tobacco: Former User   • Alcohol use No   • Drug use: No   • Sexual activity: Not Currently     Other Topics Concern   • Not on file     Social History Narrative   • No narrative on file     History   Smoking Status   • Former Smoker   • Packs/day: 1.00   • Years: 20.00   • Types: Cigarettes   • Quit date: 2/8/2006   Smokeless Tobacco   • Former User     Family History   Problem Relation Age of Onset   • Cancer Mother         BCA    • Cancer Father         esophageal and stomach    • Cancer Brother         barretts, esophageal CA @ 61     No Known Allergies    Problem List:     Patient Active Problem List    Diagnosis Date Noted   • Family history of esophageal cancer 02/08/2018   • Palpitation 02/08/2018   • Obesity (BMI 30-39.9) 02/08/2018   • Chronic GERD 02/08/2018         Medications:  "    Current Outpatient Prescriptions:   •  amoxicillin (AMOXIL) 500 MG Cap, Take 500 mg by mouth 3 times a day., Disp: , Rfl:   •  metoprolol SR (TOPROL XL) 100 MG TABLET SR 24 HR, Take 1 Tab by mouth every day., Disp: 90 Tab, Rfl: 3  •  omeprazole (PRILOSEC) 20 MG delayed-release capsule, Take 20 mg by mouth every day., Disp: , Rfl:   •  aspirin 81 MG tablet, Take 81 mg by mouth every day., Disp: , Rfl:   •  Cyanocobalamin (B-12) 2500 MCG Tab, Take  by mouth., Disp: , Rfl:   •  Ascorbic Acid (VITAMIN C) 1000 MG Tab, Take  by mouth., Disp: , Rfl:   •  Lansoprazole (PREVACID PO), Take  by mouth., Disp: , Rfl:   •  Multiple Vitamins-Minerals (MULTIVITAMIN PO), Take  by mouth., Disp: , Rfl:       Review of Systems:     Pertinent positives as per HPI, all other systems reviewed and WNL     Physical Assessment:     VS: /90 (BP Location: Left arm, Patient Position: Sitting, BP Cuff Size: Large adult)   Pulse 84   Temp 36.6 °C (97.8 °F) (Temporal)   Resp 14   Ht 1.715 m (5' 7.5\")   Wt 97.5 kg (215 lb)   SpO2 96%   BMI 33.18 kg/m²     General: Well-developed, well-nourished, male     Head: PERRL, EOMI. Normocephalic. No facial asymmetry noted.  Cardiovasc:RRR, no MRG. No thrills or bruits. Pulses 2+ and symmetric at all distal extremities.  Pulmonary: Lungs clear bilaterally.  Normal respiratory effort. No wheeze or crackles.   Extremities: No edema  Neuro: Alert and oriented, CNs II-XII intact, no focal deficits.  Skin:No rashes noted. Skin warm, dry, intact    Psych: Dressed appropriately for the weather, pleasant and conversant.  Affect, mood & judgment appropriate.    Assessment/Plan:   Vitaly was seen today for medication refill and results.    Diagnoses and all orders for this visit:    Palpitation, well controlled with present treatment, monitor   -     metoprolol SR (TOPROL XL) 100 MG TABLET SR 24 HR; Take 1 Tab by mouth every day.    Elevated fasting glucose  - noted on lab review, asymptomatic, admits " to poor diet, discussed TLM, consider repeat labs in 6 months     Chronic GERD, chronic, controlled on PPI, monitor           Patient is agreeable to the above plan and voiced understanding. All questions answered.     Please note that this dictation was created using voice recognition software. I have made every reasonable attempt to correct obvious errors, but I expect that there are errors of grammar and possibly content that I did not discover before finalizing the note.      VITO Morris  5/9/2019, 2:00 PM

## 2019-10-31 ENCOUNTER — OFFICE VISIT (OUTPATIENT)
Dept: MEDICAL GROUP | Facility: PHYSICIAN GROUP | Age: 54
End: 2019-10-31
Payer: COMMERCIAL

## 2019-10-31 VITALS
RESPIRATION RATE: 14 BRPM | OXYGEN SATURATION: 99 % | TEMPERATURE: 97.2 F | SYSTOLIC BLOOD PRESSURE: 124 MMHG | HEIGHT: 68 IN | HEART RATE: 88 BPM | BODY MASS INDEX: 33.65 KG/M2 | WEIGHT: 222 LBS | DIASTOLIC BLOOD PRESSURE: 72 MMHG

## 2019-10-31 DIAGNOSIS — R73.01 ELEVATED FASTING GLUCOSE: ICD-10-CM

## 2019-10-31 DIAGNOSIS — Z13.29 SCREENING FOR THYROID DISORDER: ICD-10-CM

## 2019-10-31 DIAGNOSIS — Z13.6 SCREENING FOR CARDIOVASCULAR CONDITION: ICD-10-CM

## 2019-10-31 DIAGNOSIS — K21.9 CHRONIC GERD: ICD-10-CM

## 2019-10-31 DIAGNOSIS — R00.2 PALPITATION: ICD-10-CM

## 2019-10-31 PROCEDURE — 99213 OFFICE O/P EST LOW 20 MIN: CPT | Performed by: NURSE PRACTITIONER

## 2019-10-31 RX ORDER — METOPROLOL SUCCINATE 100 MG/1
100 TABLET, EXTENDED RELEASE ORAL DAILY
Qty: 90 TAB | Refills: 3 | Status: SHIPPED | OUTPATIENT
Start: 2019-10-31 | End: 2020-10-28 | Stop reason: SDUPTHER

## 2019-10-31 ASSESSMENT — PATIENT HEALTH QUESTIONNAIRE - PHQ9: CLINICAL INTERPRETATION OF PHQ2 SCORE: 0

## 2019-10-31 NOTE — ASSESSMENT & PLAN NOTE
This is a chronic health problem that is well controlled with current medications and lifestyle measures.   Taking metoprolol  mg daily.  Prior to starting metoprolol, echocardiogram showed PVCs.  Since starting metoprolol, frequency of palpitations have significantly decreased.  Denies chest pain, shortness of breath, lightheadedness.  Will get updated TSH.

## 2019-10-31 NOTE — PROGRESS NOTES
CC: Establish care, medication refills    HISTORY OF THE PRESENT ILLNESS: Patient is a 54 y.o. male. This pleasant patient is here today, accompanied by wife, to establish care and for evaluation and management of the following health problems.  Patient's previous primary care provider is Emy MAXWELL.    Health Maintenance: Patient declined flu vaccine, though we reviewed benefits of flu vaccine.  Encouraged him to wash his hands frequently and stay out of environments with people who are ill.        Palpitation  This is a chronic health problem that is well controlled with current medications and lifestyle measures.   Taking metoprolol  mg daily.  Prior to starting metoprolol, echocardiogram showed PVCs.  Since starting metoprolol, frequency of palpitations have significantly decreased.  Denies chest pain, shortness of breath, lightheadedness.  Will get updated TSH.    Chronic GERD  This is a chronic health problem that is well controlled with current medications and lifestyle measures.  Taking omeprazole.  Denies abdominal pain, epigastric pain, blood in stool, dark black stool.    Elevated fasting glucose  Lab results from 5/2019 shows elevated fasting glucose of 115.  Patient reports he has not changed his lifestyle measures since lab results.  Did review lifestyle measures today and explained that lifestyle measures may help prevent diabetes.  Will get A1c prior to next appointment.      Allergies: Patient has no known allergies.    Current Outpatient Medications Ordered in Epic   Medication Sig Dispense Refill   • metoprolol SR (TOPROL XL) 100 MG TABLET SR 24 HR Take 1 Tab by mouth every day. 90 Tab 3   • omeprazole (PRILOSEC) 20 MG delayed-release capsule Take 20 mg by mouth every day.     • aspirin 81 MG tablet Take 81 mg by mouth every day.     • Cyanocobalamin (B-12) 2500 MCG Tab Take  by mouth.     • Ascorbic Acid (VITAMIN C) 1000 MG Tab Take  by mouth.     • Multiple Vitamins-Minerals  "(MULTIVITAMIN PO) Take  by mouth.       No current Epic-ordered facility-administered medications on file.        History reviewed. No pertinent past medical history.    History reviewed. No pertinent surgical history.    Social History     Tobacco Use   • Smoking status: Former Smoker     Packs/day: 1.00     Years: 20.00     Pack years: 20.00     Types: Cigarettes     Last attempt to quit: 2006     Years since quittin.7   • Smokeless tobacco: Former User   Substance Use Topics   • Alcohol use: No   • Drug use: No       Family History   Problem Relation Age of Onset   • Cancer Mother         BCA    • Cancer Father         esophageal and stomach    • Cancer Brother         barretts, esophageal CA @ 61       ROS:   As in HPI, otherwise negative for chest pain, dyspnea, abdominal pain, dysuria, blood in stool, fever           Exam: /72   Pulse 88   Temp 36.2 °C (97.2 °F)   Resp 14   Ht 1.715 m (5' 7.5\")   Wt 100.7 kg (222 lb)   SpO2 99%  Body mass index is 34.26 kg/m².    General: Alert, pleasant, obese habitus, well nourished, well developed male in NAD  HEENT: Normocephalic. Eyes conjunctiva clear lids without ptosis, pupils equal and reactive to light, ears normal shape and contour, canals are clear bilaterally, tympanic membranes are pearly gray with good light reflex, nasal mucosa without erythema and drainage, oropharynx is without erythema, edema or exudates.   Neck: Supple without bruit. Thyroid is not enlarged.  Pulmonary: Clear to ausculation.  Normal effort. No rales, ronchi, or wheezing.  Cardiovascular: Normal rate and rhythm without murmur. Carotid and radial pulses are intact and equal bilaterally.  No lower extremity edema.  Abdomen: Soft, nontender, nondistended. Normal bowel sounds. Liver and spleen are not palpable  Neurologic: Grossly nonfocal  Lymph: No cervical or supraclavicular lymph nodes are palpable  Skin: Warm and dry.    Musculoskeletal: Normal gait.   Psych: Normal " mood and affect. Alert and oriented. Judgment and insight is normal.    Please note that this dictation was created using voice recognition software. I have made every reasonable attempt to correct obvious errors, but I expect that there are errors of grammar and possibly content that I did not discover before finalizing the note.      Assessment/Plan  1. Palpitation  Well-controlled.  No medication changes.  Refill sent to pharmacy.  - metoprolol SR (TOPROL XL) 100 MG TABLET SR 24 HR; Take 1 Tab by mouth every day.  Dispense: 90 Tab; Refill: 3    2. Chronic GERD  Well-controlled.  Continue with PPI and lifestyle measures.    3. Elevated fasting glucose  Reviewed lifestyle measures.  Strongly encourage patient to work on diet, exercise, weight loss.  Will get CMP and A1c prior to next appointment.  - Comp Metabolic Panel; Future  - HEMOGLOBIN A1C; Future    4. Screening for cardiovascular condition  Will notify patient of results at next appointment.  - Lipid Profile; Future  - ESTIMATED GFR; Future    5. Screening for thyroid disorder  Will notify patient of results at next appointment.  - TSH; Future  - FREE THYROXINE; Future      Patient will return to clinic in 6 months or sooner if needed.

## 2019-10-31 NOTE — ASSESSMENT & PLAN NOTE
Lab results from 5/2019 shows elevated fasting glucose of 115.  Patient reports he has not changed his lifestyle measures since lab results.  Did review lifestyle measures today and explained that lifestyle measures may help prevent diabetes.  Will get A1c prior to next appointment.

## 2019-11-12 ENCOUNTER — APPOINTMENT (OUTPATIENT)
Dept: URGENT CARE | Facility: PHYSICIAN GROUP | Age: 54
End: 2019-11-12
Payer: COMMERCIAL

## 2019-11-12 ENCOUNTER — OCCUPATIONAL MEDICINE (OUTPATIENT)
Dept: URGENT CARE | Facility: PHYSICIAN GROUP | Age: 54
End: 2019-11-12
Payer: COMMERCIAL

## 2019-11-12 ENCOUNTER — APPOINTMENT (OUTPATIENT)
Dept: RADIOLOGY | Facility: IMAGING CENTER | Age: 54
End: 2019-11-12
Attending: PHYSICIAN ASSISTANT
Payer: COMMERCIAL

## 2019-11-12 VITALS
OXYGEN SATURATION: 95 % | BODY MASS INDEX: 33.65 KG/M2 | HEART RATE: 92 BPM | RESPIRATION RATE: 16 BRPM | WEIGHT: 222 LBS | HEIGHT: 68 IN | SYSTOLIC BLOOD PRESSURE: 128 MMHG | DIASTOLIC BLOOD PRESSURE: 92 MMHG | TEMPERATURE: 97.8 F

## 2019-11-12 DIAGNOSIS — M70.51 INFRAPATELLAR BURSITIS OF BOTH KNEES: ICD-10-CM

## 2019-11-12 DIAGNOSIS — M70.52 INFRAPATELLAR BURSITIS OF BOTH KNEES: ICD-10-CM

## 2019-11-12 DIAGNOSIS — S89.92XA KNEE INJURY, LEFT, INITIAL ENCOUNTER: Primary | ICD-10-CM

## 2019-11-12 DIAGNOSIS — L03.116 CELLULITIS OF KNEE, LEFT: ICD-10-CM

## 2019-11-12 DIAGNOSIS — S89.92XA KNEE INJURY, LEFT, INITIAL ENCOUNTER: ICD-10-CM

## 2019-11-12 PROCEDURE — 99214 OFFICE O/P EST MOD 30 MIN: CPT | Mod: 29 | Performed by: PHYSICIAN ASSISTANT

## 2019-11-12 PROCEDURE — 73564 X-RAY EXAM KNEE 4 OR MORE: CPT | Mod: TC,FY,LT | Performed by: FAMILY MEDICINE

## 2019-11-12 RX ORDER — HYDROCODONE BITARTRATE AND ACETAMINOPHEN 5; 325 MG/1; MG/1
1 TABLET ORAL EVERY 4 HOURS PRN
Qty: 20 TAB | Refills: 0 | Status: SHIPPED | OUTPATIENT
Start: 2019-11-12 | End: 2019-11-22

## 2019-11-12 RX ORDER — SULFAMETHOXAZOLE AND TRIMETHOPRIM 800; 160 MG/1; MG/1
1 TABLET ORAL 2 TIMES DAILY
Qty: 20 TAB | Refills: 0 | Status: SHIPPED | OUTPATIENT
Start: 2019-11-12 | End: 2019-11-22

## 2019-11-12 NOTE — LETTER
[facility]  [facility] [place] [address]  Phone:  [phone] - Fax:  [phone]   [facility] Progress Report and Disability Certification  Date of Service: 2019   No Show:  No  Date / Time of Next Visit: 2019   Claim Information   Patient Name: Vitaly Paulino  Claim Number:     Employer:   [employer] Date of Injury: 2019     Insurer / TPA: [employer]  ID / SSN:     Occupation:   Diagnosis: The primary encounter diagnosis was Knee injury, left, initial encounter. Diagnoses of Cellulitis of knee, left and Infrapatellar bursitis of both knees were also pertinent to this visit.    Medical Information   Related to Industrial Injury? Yes    Subjective Complaints:  DOI: 2019.  PT co left knee pain and swelling that started after kneeling on the ground while at work as a .  Pt states he felt a sudden pain, then went away, but came back later in the evening with gradual worsening over the last week.  Pt denies previous injury to bursitis to this knee.  Pt has pain with ambulation, ROM.  Pt has been using crutches the last 3 days secondary to pain with ambulation.    Objective Findings: Left knee exam: moderate swelling with warmth and erythema that extends to mid shin and calf.  No obvious skin lesions , but skin of knee is very dry and cracked.  Distal neuro/vascular intact.  Full capacity    Pre-Existing Condition(s):     Assessment:   Initial Visit    Status: Additional Care Required  Permanent Disability:No    Plan: Medication  Comments:bactrim DS BID x 10 days    Diagnostics: X-ray    Comments:       Disability Information   Status: Released to Restricted Duty    From:  2019  Through: 2019 Restrictions are: Temporary   Physical Restrictions   Sitting:    Standing:    Stooping:    Bending:      Squattin hrs/day Walking:    Climbin hrs/day Pushin hrs/day   Pullin hrs/day  Other:    Reaching Above Shoulder (L):   Reaching Above Shoulder (R):       Reaching Below Shoulder (L):    Reaching Below Shoulder (R):      Not to exceed Weight Limits   Carrying(hrs):   Weight Limit(lb):   Lifting(hrs):   Weight  Limit(lb):     Comments: Keep ACE wrap on knee for swelling.  OTC nsaids tid for pain and swelling. Rx antibiotics BID x 10 days.  Ice as desired for pain,  Follow up in one day.  Use crutches as desired for comfort while ambulating.     Repetitive Actions   Hands: i.e. Fine Manipulations from Grasping:     Feet: i.e. Operating Foot Controls:     Driving / Operate Machinery: 0 hrs/day   Physician Name: Ruma Ying P.A.-C. Physician Signature: RUMA Dolan P.A.-C. e-Signature: Dr. Lior Arroyo, Medical Director   Clinic Name / Location: [address] [address] Clinic Phone Number: Dept: [phone]   Appointment Time: 11:05 Am Visit Start Time: 12:16 PM   Check-In Time:  11:24 Am Visit Discharge Time:  3:28 pm   Original-Treating Physician or Chiropractor    Page 2-Insurer/TPA    Page 3-Employer    Page 4-Employee

## 2019-11-12 NOTE — PROGRESS NOTES
"Subjective:      Vitaly Paulino is a 54 y.o. male who presents with Knee Injury    PMH:  has no past medical history on file.  MEDS:   Current Outpatient Medications:   •  metoprolol SR (TOPROL XL) 100 MG TABLET SR 24 HR, Take 1 Tab by mouth every day., Disp: 90 Tab, Rfl: 3  •  omeprazole (PRILOSEC) 20 MG delayed-release capsule, Take 20 mg by mouth every day., Disp: , Rfl:   •  aspirin 81 MG tablet, Take 81 mg by mouth every day., Disp: , Rfl:   •  Cyanocobalamin (B-12) 2500 MCG Tab, Take  by mouth., Disp: , Rfl:   •  Ascorbic Acid (VITAMIN C) 1000 MG Tab, Take  by mouth., Disp: , Rfl:   •  Multiple Vitamins-Minerals (MULTIVITAMIN PO), Take  by mouth., Disp: , Rfl:   ALLERGIES: No Known Allergies  SURGHX: No past surgical history on file.  SOCHX:  reports that he quit smoking about 13 years ago. His smoking use included cigarettes. He has a 20.00 pack-year smoking history. He has quit using smokeless tobacco. He reports that he does not drink alcohol or use drugs.  FH: Reviewed with patient, not pertinent to this visit.           Patient presents with:  Knee Injury, swelling, redness x one week. Started after kneeling on the ground at work.         Knee Injury         Review of Systems   Musculoskeletal: Positive for joint pain.   Neurological: Negative for tingling, sensory change and focal weakness.   All other systems reviewed and are negative.         Objective:     /92 (BP Location: Right arm, Patient Position: Sitting, BP Cuff Size: Large adult)   Pulse 92   Temp 36.6 °C (97.8 °F) (Temporal)   Resp 16   Ht 1.715 m (5' 7.5\")   Wt 100.7 kg (222 lb)   SpO2 95%   BMI 34.26 kg/m²      Physical Exam  Musculoskeletal:      Left knee: He exhibits decreased range of motion, swelling and erythema. He exhibits no ecchymosis and no deformity. Tenderness found.        Legs:       Comments: Left knee exam: moderate swelling with warmth and erythema that extends to mid shin and calf.  No obvious skin lesions , " but skin of knee is very dry and cracked.  Distal neuro/vascular intact. Cap refill < 2 sec             Xray images viewed and interpreted by me, confirmed by radiology:     neg     Assessment/Plan:     1. Knee injury, left, initial encounter  DX-KNEE COMPLETE 4+ LEFT    sulfamethoxazole-trimethoprim (BACTRIM DS) 800-160 MG tablet    HYDROcodone-acetaminophen (NORCO) 5-325 MG Tab per tablet   2. Cellulitis of knee, left  DX-KNEE COMPLETE 4+ LEFT    sulfamethoxazole-trimethoprim (BACTRIM DS) 800-160 MG tablet    HYDROcodone-acetaminophen (NORCO) 5-325 MG Tab per tablet   3. Infrapatellar bursitis of both knees  HYDROcodone-acetaminophen (NORCO) 5-325 MG Tab per tablet     PT to begin antibiotics today.      I spoke with Dr Tse, [facility] regarding patient, agrees with treating infection first, with close follow up tomorrow then in 1-2 days after that.  If no improvement, ER is indicated, otherwise pt to finish abx and then determine if any knee derangement.  Pt verbalized understanding of this plan.     Follow D-39 instructions/restrictions. Return to clinic as scheduled.

## 2019-11-12 NOTE — LETTER
"EMPLOYEE’S CLAIM FOR COMPENSATION/ REPORT OF INITIAL TREATMENT  FORM C-4    EMPLOYEE’S CLAIM - PROVIDE ALL INFORMATION REQUESTED   First Name  Vitaly Last Name  Jefferson Birthdate                    1965                Sex  male Claim Number   Home Address  [address] Age  54 y.o. Height  1.715 m (5' 7.5\") Weight  100.7 kg (222 lb) [facility]     [place]  [address] Telephone  [phone] (home)    Mailing Address  [address] [place]  [address] Primary Language Spoken  English    Insurer  na Third Party   [employer]   Employee's Occupation (Job Title) When Injury or Occupational Disease Occurred      Employer's Name    [employer]  Telephone  [phone]    Employer Address  [address] [place]  [address]    Date of Injury  11/5/2019               Hour of Injury  11:00 AM Date Employer Notified  11/6/2019 Last Day of Work after Injury or Occupational Disease  11/8/2019 Supervisor to Whom Injury Reported  JOSEMANUEL PAGAN   Address or Location of Accident (if applicable)  [[address]]   What were you doing at the time of accident? (if applicable)  Setting lift on a car    How did this injury or occupational disease occur? (Be specific an answer in detail. Use additional sheet if necessary)  I was setting the lift on a car, while kneeling on floor I felt a stabbing pain in my knee   If you believe that you have an occupational disease, when did you first have knowledge of the disability and it relationship to your employment?  n/a Witnesses to the Accident  NONE      Nature of Injury or Occupational Disease  Strain  Part(s) of Body Injured or Affected  Knee (L), N/A, N/A    I certify that the above is true and correct to the best of my knowledge and that I have provided this information in order to obtain the benefits of [place] " Insurance and [place]  I hereby authorize any physician, chiropractor, surgeon, practitioner, or other person, any hospital, including [facility] or [facility], any medical service organization, any insurance company, or other institution or organization to release to each other, any medical or other information, including benefits paid or payable, pertinent to this injury or disease, except information relative to diagnosis, treatment and/or counseling for AIDS, psychological conditions, alcohol or controlled substances, for which I must give specific authorization.  A Photostat of this authorization shall be as valid as the original.     Date 11/12/2019   [facility]   Employee’s Signature   THIS REPORT MUST BE COMPLETED AND MAILED WITHIN 3 WORKING DAYS OF TREATMENT   [facility]  Name of [facility]   Date  11/12/2019 Diagnosis  (S89.92XA) Knee injury, left, initial encounter  (primary encounter diagnosis)  (L03.116) Cellulitis of knee, left  (M70.51,  M70.52) Infrapatellar bursitis of both knees Is there evidence the injured employee was under the influence of alcohol and/or another controlled substance at the time of accident?   Hour  12:16 PM Description of Injury or Disease  The primary encounter diagnosis was Knee injury, left, initial encounter. Diagnoses of Cellulitis of knee, left and Infrapatellar bursitis of both knees were also pertinent to this visit. No   Treatment  Keep ACE wrap on knee for swelling.  OTC nsaids tid for pain and swelling. Rx antibiotics BID x 10 days.  Ice as desired for pain,  Follow up in one day.  Use crutches as desired for comfort while ambulating.   Have you advised the patient to remain off work five days or more? No   X-Ray Findings  Negative   If Yes   From Date  To Date      From information given by the employee, together with medical evidence, can  "you directly connect this injury or occupational disease as job incurred?  Yes If No Full Duty No Modified Duty  Yes   Is additional medical care by a physician indicated?  Yes If Modified Duty, Specify any Limitations / Restrictions  No bending, squatting, pushing, pulling.  Must use crutches to ambulate.    Do you know of any previous injury or disease contributing to this condition or occupational disease?                            No   Date  11/12/2019 Print Doctor’s Name Ruma Ying P.A.-C. I certify the employer’s copy of  this form was mailed on:   Address  [address] Insurer’s Use Only     [place]  [address]    Provider’s Tax ID Number  [id number] Telephone  Dept: [phone]        e-RUMA Mcfarland P.A.-C.   e-Signature: Dr. Lior Arroyo,   Medical Director Degree  MD        ORIGINAL-TREATING PHYSICIAN OR CHIROPRACTOR    PAGE 2-INSURER/TPA    PAGE 3-EMPLOYER    PAGE 4-EMPLOYEE             Form C-4 (rev.10/07)              BRIEF DESCRIPTION OF RIGHTS AND BENEFITS  (Pursuant to [place] 616[place].050)    Notice of Injury or Occupational Disease (Incident Report Form C-1): If an injury or occupational disease (OD) arises out of and in the course of employment, you must provide written notice to your employer as soon as practicable, but no later than 7 days after the accident or OD. Your employer shall maintain a sufficient supply of the required forms.    Claim for Compensation (Form C-4): If medical treatment is sought, the form C-4 is available at the place of initial treatment. A completed \"Claim for Compensation\" (Form C-4) must be filed within 90 days after an accident or OD. The treating physician or chiropractor must, within 3 working days after treatment, complete and mail to the employer, the employer's insurer and third-party , the Claim for Compensation.    Medical Treatment: If you require medical treatment for your on-the-job injury or OD, you may be " required to select a physician or chiropractor from a list provided by your workers’ compensation insurer, if it has contracted with an Organization for Managed Care (MCO) or Preferred Provider Organization (PPO) or providers of health care. If your employer has not entered into a contract with an MCO or PPO, you may select a physician or chiropractor from the Panel of Physicians and Chiropractors. Any medical costs related to your industrial injury or OD will be paid by your insurer.    Temporary Total Disability (TTD): If your doctor has certified that you are unable to work for a period of at least 5 consecutive days, or 5 cumulative days in a 20-day period, or places restrictions on you that your employer does not accommodate, you may be entitled to TTD compensation.    Temporary Partial Disability (TPD): If the wage you receive upon reemployment is less than the compensation for TTD to which you are entitled, the insurer may be required to pay you TPD compensation to make up the difference. TPD can only be paid for a maximum of 24 months.    Permanent Partial Disability (PPD): When your medical condition is stable and there is an indication of a PPD as a result of your injury or OD, within 30 days, your insurer must arrange for an evaluation by a rating physician or chiropractor to determine the degree of your PPD. The amount of your PPD award depends on the date of injury, the results of the PPD evaluation and your age and wage.    Permanent Total Disability (PTD): If you are medically certified by a treating physician or chiropractor as permanently and totally disabled and have been granted a PTD status by your insurer, you are entitled to receive monthly benefits not to exceed 66 2/3% of your average monthly wage. The amount of your PTD payments is subject to reduction if you previously received a PPD award.    Vocational Rehabilitation Services: You may be eligible for vocational rehabilitation services if  you are unable to return to the job due to a permanent physical impairment or permanent restrictions as a result of your injury or occupational disease.    Transportation and Per Nichole Reimbursement: You may be eligible for travel expenses and per nichole associated with medical treatment.    Reopening: You may be able to reopen your claim if your condition worsens after claim closure.    Appeal Process: If you disagree with a written determination issued by the insurer or the insurer does not respond to your request, you may appeal to the Department of Administration, , by following the instructions contained in your determination letter. You must appeal the determination within 70 days from the date of the determination letter at [address], [address], [place], [place] [address], or [address], [place], [place] [address]. If you disagree with the  decision, you may appeal to the Department of Administration, . You must file your appeal within 30 days from the date of the  decision letter at [address], [address], [place], [place] [address], or [address] [facility] [address], [place], [place] [address]. If you disagree with a decision of an , you may file a petition for judicial review with the District Court. You must do so within 30 days of the Appeal Officer’s decision. You may be represented by an  at your own expense or you may contact the [facility] for possible representation.    [place]  for Injured Workers (NAIW): If you disagree with a  decision, you may request that NAIW represent you without charge at an  Hearing. For information regarding denial of benefits, you may contact the [facility] at: [address], [address], [place] [address], [phone], or [address] [address], [place] [address], [phone]    To File a Complaint with  the Division: If you wish to file a complaint with the  of the Division of Industrial Relations (DIR),  please contact the Workers’ Compensation Section, [address], [address], [place], [place] [address], telephone (808) [phone], or [address], [address], [place], [place] [address], telephone [phone]) [phone].    For assistance with Workers’ Compensation Issues: You may contact the Office of the Governor Consumer Health Assistance, [address] [place] [address], [address], [place], [place] [address], Toll Free 1-126.771.7933, Web site: http://Club [employer].Replaced by [facility].[place].us, E-mail [email or web address] by [facility].nv.                  11/12/2019        __________________________________________________________________                                                     _________        Employee Name / Signature                                                                                                                                              Date                                                                                                                                                                                                     D-2 (rev. 06/18)

## 2019-11-12 NOTE — LETTER
[facility]  [facility] [place] [address]  Phone:  [phone] - Fax:  [phone]   [facility] Progress Report and Disability Certification  Date of Service: 2019   No Show:  No  Date / Time of Next Visit: 2019   Claim Information   Patient Name: Vitaly Paulino  Claim Number:     Employer:   *** Date of Injury: 2019     Insurer / TPA: [employer] *** ID / SSN:     Occupation:  *** Diagnosis: The primary encounter diagnosis was Knee injury, left, initial encounter. Diagnoses of Cellulitis of knee, left and Infrapatellar bursitis of both knees were also pertinent to this visit.    Medical Information   Related to Industrial Injury? Yes ***   Subjective Complaints:  DOI: 2019.  PT co left knee pain and swelling that started after kneeling on the ground while at work as a .  Pt states he felt a sudden pain, then went away, but came back later in the evening with gradual worsening over the last week.  Pt denies previous injury to bursitis to this knee.  Pt has pain with ambulation, ROM.  Pt has been using crutches the last 3 days secondary to pain with ambulation.    Objective Findings: Left knee exam: moderate swelling with warmth and erythema that extends to mid shin and calf.  No obvious skin lesions , but skin of knee is very dry and cracked.  Distal neuro/vascular intact.  Full capacity    Pre-Existing Condition(s):     Assessment:   Initial Visit    Status: Additional Care Required  Permanent Disability:No    Plan: Medication  Comments:bactrim DS BID x 10 days    Diagnostics: X-ray    Comments:       Disability Information   Status: Released to Restricted Duty    From:  2019  Through: 2019 Restrictions are: Temporary   Physical Restrictions   Sitting:    Standing:    Stooping:    Bending:      Squattin hrs/day Walking:    Climbin hrs/day Pushin hrs/day   Pullin hrs/day Other:   Reaching Above Shoulder (L):   Reaching Above Shoulder (R):       Reaching Below Shoulder (L):    Reaching Below Shoulder (R):      Not to exceed Weight Limits   Carrying(hrs):   Weight Limit(lb):   Lifting(hrs):   Weight  Limit(lb):     Comments: Keep ACE wrap on knee for swelling.  OTC nsaids tid for pain and swelling. Rx antibiotics BID x 10 days.  Ice as desired for pain,  Follow up in one day.  Use crutches as desired for comfort while ambulating.     Repetitive Actions   Hands: i.e. Fine Manipulations from Grasping:     Feet: i.e. Operating Foot Controls:     Driving / Operate Machinery: 0 hrs/day   Physician Name: Ruma Ying P.A.-C. Physician Signature: RUMA Dolan P.A.-C. e-Signature: Dr. Lior Arroyo, Medical Director   Clinic Name / Location: [address] [address] Clinic Phone Number: Dept: [phone]   Appointment Time: 11:05 Am Visit Start Time: 12:16 PM   Check-In Time:  11:24 Am Visit Discharge Time:  ***   Original-Treating Physician or Chiropractor    Page 2-Insurer/TPA    Page 3-Employer    Page 4-Employee

## 2019-11-13 ENCOUNTER — OCCUPATIONAL MEDICINE (OUTPATIENT)
Dept: URGENT CARE | Facility: PHYSICIAN GROUP | Age: 54
End: 2019-11-13
Payer: COMMERCIAL

## 2019-11-13 VITALS
DIASTOLIC BLOOD PRESSURE: 82 MMHG | HEART RATE: 100 BPM | BODY MASS INDEX: 33.65 KG/M2 | TEMPERATURE: 97.6 F | OXYGEN SATURATION: 97 % | SYSTOLIC BLOOD PRESSURE: 126 MMHG | WEIGHT: 222 LBS | RESPIRATION RATE: 16 BRPM | HEIGHT: 68 IN

## 2019-11-13 DIAGNOSIS — S89.92XD LEFT KNEE INJURY, SUBSEQUENT ENCOUNTER: ICD-10-CM

## 2019-11-13 DIAGNOSIS — L03.116 CELLULITIS OF KNEE, LEFT: ICD-10-CM

## 2019-11-13 PROCEDURE — 99214 OFFICE O/P EST MOD 30 MIN: CPT | Mod: 29 | Performed by: PHYSICIAN ASSISTANT

## 2019-11-13 ASSESSMENT — ENCOUNTER SYMPTOMS
SENSORY CHANGE: 0
TINGLING: 0
FOCAL WEAKNESS: 0

## 2019-11-13 NOTE — PROGRESS NOTES
Chief Complaint   Patient presents with   • Follow-Up     Wc Fv for L knee injury/ L knee redness, swollen        HISTORY OF PRESENT ILLNESS: Patient is a 54 y.o. male who presents today because he is here for a Worker's Comp. follow-up visit.    He was initially seen yesterday, 11/12/2019, for an injury at work with the date of injury of 11/5/2019.    He was seen yesterday, diagnosed with a cellulitis of his left knee which he sustained after kneeling on the ground.  He was put on Bactrim yesterday.  In the last 24-hour improvement in the redness, swelling and the pain, but it is still painful      Patient Active Problem List    Diagnosis Date Noted   • Elevated fasting glucose 10/31/2019   • Family history of esophageal cancer 02/08/2018   • Palpitation 02/08/2018   • Obesity (BMI 30-39.9) 02/08/2018   • Chronic GERD 02/08/2018       Allergies:Patient has no known allergies.    Current Outpatient Medications Ordered in Epic   Medication Sig Dispense Refill   • sulfamethoxazole-trimethoprim (BACTRIM DS) 800-160 MG tablet Take 1 Tab by mouth 2 times a day for 10 days. 20 Tab 0   • HYDROcodone-acetaminophen (NORCO) 5-325 MG Tab per tablet Take 1 Tab by mouth every four hours as needed for up to 10 days. No Driving or alcohol with medication given. 20 Tab 0   • metoprolol SR (TOPROL XL) 100 MG TABLET SR 24 HR Take 1 Tab by mouth every day. 90 Tab 3   • omeprazole (PRILOSEC) 20 MG delayed-release capsule Take 20 mg by mouth every day.     • aspirin 81 MG tablet Take 81 mg by mouth every day.     • Cyanocobalamin (B-12) 2500 MCG Tab Take  by mouth.     • Ascorbic Acid (VITAMIN C) 1000 MG Tab Take  by mouth.     • Multiple Vitamins-Minerals (MULTIVITAMIN PO) Take  by mouth.       No current Epic-ordered facility-administered medications on file.        History reviewed. No pertinent past medical history.    Social History     Tobacco Use   • Smoking status: Former Smoker     Packs/day: 1.00     Years: 20.00     Pack  "years: 20.00     Types: Cigarettes     Last attempt to quit: 2006     Years since quittin.7   • Smokeless tobacco: Former User   Substance Use Topics   • Alcohol use: No   • Drug use: No       Family Status   Relation Name Status   • Mo     • Fa  (Not Specified)   • Bro  (Not Specified)     Family History   Problem Relation Age of Onset   • Cancer Mother         BCA    • Cancer Father         esophageal and stomach    • Cancer Brother         barretts, esophageal CA @ 61       ROS:  Review of Systems   Constitutional: Negative for fever, chills, weight loss and malaise/fatigue.   HENT: Negative for ear pain, nosebleeds, congestion, sore throat and neck pain.    Eyes: Negative for blurred vision.   Respiratory: Negative for cough, sputum production, shortness of breath and wheezing.    Cardiovascular: Negative for chest pain, palpitations, orthopnea and leg swelling.   Gastrointestinal: Negative for heartburn, nausea, vomiting and abdominal pain.   Genitourinary: Negative for dysuria, urgency and frequency.     Exam:  /82   Pulse 100   Temp 36.4 °C (97.6 °F) (Temporal)   Resp 16   Ht 1.715 m (5' 7.5\")   Wt 100.7 kg (222 lb)   SpO2 97%   General:  Well nourished, well developed male in NAD  Head:Normocephalic, atraumatic  Eyes: PERRLA, EOM within normal limits, no conjunctival injection, no scleral icterus, visual fields and acuity grossly intact.  Nose: Symmetrical without tenderness, no discharge.  Mouth: reasonable hygiene, no erythema exudates or tonsillar enlargement.  Neck: no masses, range of motion within normal limits, no tracheal deviation. No obvious thyroid enlargement.  Extremities: no clubbing, cyanosis, or edema.  Right knee is erythematous, extending distally down his right leg and possibly up his thigh but improved according to the previous outline    Please note that this dictation was created using voice recognition software. I have made every reasonable attempt to " correct obvious errors, but I expect that there are errors of grammar and possibly content that I did not discover before finalizing the note.    Assessment/Plan:  1. Left knee injury, subsequent encounter     2. Cellulitis of knee, left       Follow-up in 9 days after finishing course of antibiotics, sooner for any changes

## 2019-11-13 NOTE — LETTER
[facility]  [facility] [place] [address]  Phone:  [phone] - Fax:  [phone]   [facility] Progress Report and Disability Certification  Date of Service: 2019   No Show:  No  Date / Time of Next Visit: 2019   Claim Information   Patient Name: Vitaly Paulino  Claim Number:     Employer:   [employer] Date of Injury: 2019     Insurer / TPA: [employer]  ID / SSN:     Occupation:   Diagnosis: Diagnoses of Left knee injury, subsequent encounter and Cellulitis of knee, left were pertinent to this visit.    Medical Information   Related to Industrial Injury? Yes    Subjective Complaints:  Improved  left knee pain and swelling   Objective Findings: Extremities: no clubbing, cyanosis, or edema.  Right knee is erythematous, extending distally down his right leg and possibly up his thigh but improved according to the previous outline     Pre-Existing Condition(s):     Assessment:   Condition Improved    Status: Additional Care Required  Comments:Follow-up in 9 days  Permanent Disability:No    Plan: Medication  Comments:Continue antibiotics    Diagnostics:      Comments:       Disability Information   Status: Released to Restricted Duty    From:  2019  Through: 2019 Restrictions are: Temporary   Physical Restrictions   Sitting:    Standing:    Stoopin hrs/day Bendin hrs/day   Squattin hrs/day Walking:  < or = to 1 hr/day Climbing:    Pushin hrs/day   Pullin hrs/day Other:    Reaching Above Shoulder (L):   Reaching Above Shoulder (R):       Reaching Below Shoulder (L):    Reaching Below Shoulder (R):      Not to exceed Weight Limits   Carrying(hrs):   Weight Limit(lb):   Lifting(hrs):   Weight  Limit(lb):     Comments:      Repetitive Actions   Hands: i.e. Fine Manipulations from Grasping:     Feet: i.e. Operating Foot Controls:     Driving / Operate Machinery:     Physician Name:  Daniel Sebastian P.A.-C. Physician Signature: DANIEL Guido P.A.-C. e-Signature: Dr. Lior Arroyo, Medical Director   Clinic Name / Location: [address] [address] Clinic Phone Number: Dept: [phone]   Appointment Time: 10:20 Am Visit Start Time: 10:30 AM   Check-In Time:  10:10 Am Visit Discharge Time:  10:50 am    Original-Treating Physician or Chiropractor    Page 2-Insurer/TPA    Page 3-Employer    Page 4-Employee

## 2019-11-23 ENCOUNTER — OCCUPATIONAL MEDICINE (OUTPATIENT)
Dept: URGENT CARE | Facility: PHYSICIAN GROUP | Age: 54
End: 2019-11-23
Payer: COMMERCIAL

## 2019-11-23 VITALS
TEMPERATURE: 97.6 F | HEIGHT: 68 IN | SYSTOLIC BLOOD PRESSURE: 136 MMHG | HEART RATE: 84 BPM | WEIGHT: 222 LBS | OXYGEN SATURATION: 96 % | RESPIRATION RATE: 14 BRPM | BODY MASS INDEX: 33.65 KG/M2 | DIASTOLIC BLOOD PRESSURE: 88 MMHG

## 2019-11-23 DIAGNOSIS — S89.92XD LEFT KNEE INJURY, SUBSEQUENT ENCOUNTER: ICD-10-CM

## 2019-11-23 DIAGNOSIS — L03.116 CELLULITIS OF KNEE, LEFT: ICD-10-CM

## 2019-11-23 PROCEDURE — 99214 OFFICE O/P EST MOD 30 MIN: CPT | Mod: 29 | Performed by: PHYSICIAN ASSISTANT

## 2019-11-23 NOTE — PROGRESS NOTES
Chief Complaint   Patient presents with   • Follow-Up       HISTORY OF PRESENT ILLNESS: Patient is a 54 y.o. male who presents today because he is here for a Worker's Comp. follow-up visit.    He was initially seen  11/12/2019, for an injury at work with the date of injury of 11/5/2019.   He was seen on 11/12/2019, diagnosed with a cellulitis of his left knee which he sustained after kneeling on the ground. He was put on Bactrim on that day.  He had improvement over the next 24-hours in the redness, swelling and the pain, but it was still painful  Since his last visit 1 week ago, he has had great improvement in the pain and redness, but his kneecap is still swollen and has a little bit of edema in his left lower extremity.  No distal paresthesias.  He has been able to walk on it but it is minimally painful at certain times and with certain movements.      Patient Active Problem List    Diagnosis Date Noted   • Elevated fasting glucose 10/31/2019   • Family history of esophageal cancer 02/08/2018   • Palpitation 02/08/2018   • Obesity (BMI 30-39.9) 02/08/2018   • Chronic GERD 02/08/2018       Allergies:Patient has no known allergies.    Current Outpatient Medications Ordered in Epic   Medication Sig Dispense Refill   • metoprolol SR (TOPROL XL) 100 MG TABLET SR 24 HR Take 1 Tab by mouth every day. 90 Tab 3   • omeprazole (PRILOSEC) 20 MG delayed-release capsule Take 20 mg by mouth every day.     • aspirin 81 MG tablet Take 81 mg by mouth every day.     • Cyanocobalamin (B-12) 2500 MCG Tab Take  by mouth.     • Ascorbic Acid (VITAMIN C) 1000 MG Tab Take  by mouth.     • Multiple Vitamins-Minerals (MULTIVITAMIN PO) Take  by mouth.       No current Epic-ordered facility-administered medications on file.        History reviewed. No pertinent past medical history.    Social History     Tobacco Use   • Smoking status: Former Smoker     Packs/day: 1.00     Years: 20.00     Pack years: 20.00     Types: Cigarettes     Last  attempt to quit: 2006     Years since quittin.7   • Smokeless tobacco: Former User   Substance Use Topics   • Alcohol use: No   • Drug use: No       Family Status   Relation Name Status   • Mo     • Fa  (Not Specified)   • Bro  (Not Specified)     Family History   Problem Relation Age of Onset   • Cancer Mother         BCA    • Cancer Father         esophageal and stomach    • Cancer Brother         barretts, esophageal CA @ 61       ROS:  Review of Systems   Constitutional: Negative for fever, chills, weight loss and malaise/fatigue.   HENT: Negative for ear pain, nosebleeds, congestion, sore throat and neck pain.    Eyes: Negative for blurred vision.   Respiratory: Negative for cough, sputum production, shortness of breath and wheezing.    Cardiovascular: Negative for chest pain, palpitations, orthopnea and leg swelling.   Gastrointestinal: Negative for heartburn, nausea, vomiting and abdominal pain.   Genitourinary: Negative for dysuria, urgency and frequency.     Exam:  There were no vitals taken for this visit.  General:  Well nourished, well developed male in NAD  Head:Normocephalic, atraumatic  Eyes: PERRLA, EOM within normal limits, no conjunctival injection, no scleral icterus, visual fields and acuity grossly intact.  Nose: Symmetrical without tenderness, no discharge.  Mouth: reasonable hygiene, no erythema exudates or tonsillar enlargement.  Neck: no masses, range of motion within normal limits, no tracheal deviation. No obvious thyroid enlargement.  Extremities: no clubbing, cyanosis, or edema.  He has prepatellar edema as well as 1-2+ edema to the lower extremity, minimal tenderness, very minimal if any erythema    Please note that this dictation was created using voice recognition software. I have made every reasonable attempt to correct obvious errors, but I expect that there are errors of grammar and possibly content that I did not discover before finalizing the  note.    Assessment/Plan:  1. Left knee injury, subsequent encounter     2. Cellulitis of knee, left     Making very good improvement, elevate the leg, Ace wrap, ibuprofen as needed.  Follow-up in a week, continue restrictions

## 2019-11-23 NOTE — LETTER
[facility]  [facility] [place] [address]  Phone:  [phone] - Fax:  [phone]   [facility] Progress Report and Disability Certification  Date of Service: 2019   No Show:  No  Date / Time of Next Visit: 2019 @9:20am   Claim Information   Patient Name: Vitaly Paulino  Claim Number:     Employer:   [employer] Date of Injury: 2019     Insurer / TPA: [employer]  ID / SSN:     Occupation:   Diagnosis: Diagnoses of Left knee injury, subsequent encounter and Cellulitis of knee, left were pertinent to this visit.    Medical Information   Related to Industrial Injury? Yes    Subjective Complaints:  Continued but improving left knee pain and swelling from injury at work   Objective Findings: He has prepatellar edema as well as 1-2+ edema to the lower extremity, minimal tenderness, very minimal if any erythema     Pre-Existing Condition(s):     Assessment:   Condition Improved    Status: Additional Care Required  Comments:Follow-up in 1 week  Permanent Disability:No    Plan: Medication  Comments:Over-the-counter anti-inflammatories as needed    Diagnostics:      Comments:       Disability Information   Status: Released to Restricted Duty    From:  2019  Through: 2019 Restrictions are: Temporary   Physical Restrictions   Sitting:    Standing:  < or = to 6 hrs/day Stoopin hrs/day Bending:      Squatting:    Walking:  < or = to 6 hrs/day Climbin hrs/day Pushing:      Pulling:    Other:    Reaching Above Shoulder (L):   Reaching Above Shoulder (R):       Reaching Below Shoulder (L):    Reaching Below Shoulder (R):      Not to exceed Weight Limits   Carrying(hrs):   Weight Limit(lb): < or = to 25 pounds Lifting(hrs):   Weight  Limit(lb): < or = to 25 pounds   Comments:      Repetitive Actions   Hands: i.e. Fine Manipulations from Grasping:     Feet: i.e. Operating Foot Controls:     Driving / Operate  Machinery:     Physician Name: Daniel Sebastian P.A.-C. Physician Signature: DANIEL Guido P.A.-C. e-Signature: Dr. Lior Arroyo, Medical Director   Clinic Name / Location: [address] [address] Clinic Phone Number: Dept: [phone]   Appointment Time: 9:20 Am Visit Start Time: 9:08 AM   Check-In Time:  9:07 Am Visit Discharge Time:  9:19AM   Original-Treating Physician or Chiropractor    Page 2-Insurer/TPA    Page 3-Employer    Page 4-Employee

## 2019-11-30 ENCOUNTER — OCCUPATIONAL MEDICINE (OUTPATIENT)
Dept: URGENT CARE | Facility: PHYSICIAN GROUP | Age: 54
End: 2019-11-30
Payer: COMMERCIAL

## 2019-11-30 VITALS
HEIGHT: 68 IN | HEART RATE: 72 BPM | DIASTOLIC BLOOD PRESSURE: 80 MMHG | WEIGHT: 222 LBS | OXYGEN SATURATION: 97 % | SYSTOLIC BLOOD PRESSURE: 122 MMHG | TEMPERATURE: 97.3 F | BODY MASS INDEX: 33.65 KG/M2 | RESPIRATION RATE: 14 BRPM

## 2019-11-30 DIAGNOSIS — S89.92XD LEFT KNEE INJURY, SUBSEQUENT ENCOUNTER: ICD-10-CM

## 2019-11-30 DIAGNOSIS — L03.116 CELLULITIS OF KNEE, LEFT: ICD-10-CM

## 2019-11-30 PROCEDURE — 99213 OFFICE O/P EST LOW 20 MIN: CPT | Performed by: FAMILY MEDICINE

## 2019-11-30 NOTE — PROGRESS NOTES
Chief Complaint:    Chief Complaint   Patient presents with   • Follow-Up       History of Present Illness:    DOI: 11/5/19. Compared to visit 11/23/19, left knee pain and swelling are improved. Still a little bit of swelling and occasional mild pain. Feels can do regular full duty and does not need to return.      Review of Systems:    Constitutional: Negative for fever, chills, and diaphoresis.   Eyes: Negative for change in vision, photophobia, pain, redness, and discharge.  ENT: Negative for ear pain, ear discharge, hearing loss, tinnitus, nasal congestion, nosebleeds, and sore throat.    Respiratory: Negative for cough, hemoptysis, sputum production, shortness of breath, wheezing, and stridor.    Cardiovascular: Negative for chest pain, palpitations, orthopnea, claudication, leg swelling, and PND.   Gastrointestinal: Negative for abdominal pain, nausea, vomiting, diarrhea, constipation, blood in stool, and melena.   Genitourinary: Negative for dysuria, urinary urgency, urinary frequency, hematuria, and flank pain.   Musculoskeletal: See HPI.  Skin: Negative for rash and itching.   Neurological: Negative for dizziness, tingling, tremors, sensory change, speech change, focal weakness, seizures, loss of consciousness, and headaches.   Endo: Negative for polydipsia.   Heme: Does not bruise/bleed easily.   Psychiatric/Behavioral: Negative for depression, suicidal ideas, hallucinations, memory loss and substance abuse. The patient is not nervous/anxious and does not have insomnia.        Past Medical History:    History reviewed. No pertinent past medical history.    Past Surgical History:    History reviewed. No pertinent surgical history.    Social History:    Social History     Socioeconomic History   • Marital status:      Spouse name: Not on file   • Number of children: Not on file   • Years of education: Not on file   • Highest education level: Not on file   Occupational History   • Not on file   Social  Needs   • Financial resource strain: Not on file   • Food insecurity:     Worry: Not on file     Inability: Not on file   • Transportation needs:     Medical: Not on file     Non-medical: Not on file   Tobacco Use   • Smoking status: Former Smoker     Packs/day: 1.00     Years: 20.00     Pack years: 20.00     Types: Cigarettes     Last attempt to quit: 2006     Years since quittin.8   • Smokeless tobacco: Former User   Substance and Sexual Activity   • Alcohol use: No   • Drug use: No   • Sexual activity: Not Currently   Lifestyle   • Physical activity:     Days per week: Not on file     Minutes per session: Not on file   • Stress: Not on file   Relationships   • Social connections:     Talks on phone: Not on file     Gets together: Not on file     Attends [employer] service: Not on file     Active member of club or organization: Not on file     Attends meetings of clubs or organizations: Not on file     Relationship status: Not on file   • Intimate partner violence:     Fear of current or ex partner: Not on file     Emotionally abused: Not on file     Physically abused: Not on file     Forced sexual activity: Not on file   Other Topics Concern   • Not on file   Social History Narrative   • Not on file     Family History:    Family History   Problem Relation Age of Onset   • Cancer Mother         BCA    • Cancer Father         esophageal and stomach    • Cancer Brother         barretts, esophageal CA @ 61     Medications:    Current Outpatient Medications on File Prior to Visit   Medication Sig Dispense Refill   • metoprolol SR (TOPROL XL) 100 MG TABLET SR 24 HR Take 1 Tab by mouth every day. 90 Tab 3   • omeprazole (PRILOSEC) 20 MG delayed-release capsule Take 20 mg by mouth every day.     • aspirin 81 MG tablet Take 81 mg by mouth every day.     • Cyanocobalamin (B-12) 2500 MCG Tab Take  by mouth.     • Ascorbic Acid (VITAMIN C) 1000 MG Tab Take  by mouth.     • Multiple Vitamins-Minerals (MULTIVITAMIN PO)  "Take  by mouth.       No current facility-administered medications on file prior to visit.      Allergies:    No Known Allergies      Vitals:    Vitals:    11/30/19 0907   BP: 122/80   BP Location: Right arm   Patient Position: Sitting   BP Cuff Size: Large adult   Pulse: 72   Resp: 14   Temp: 36.3 °C (97.3 °F)   TempSrc: Temporal   SpO2: 97%   Weight: 100.7 kg (222 lb)   Height: 1.715 m (5' 7.5\")       Physical Exam:    Constitutional: Vital signs reviewed. Appears well-developed and well-nourished. No acute distress.   Eyes: Sclera white, conjunctivae clear.  ENT: External ears normal. Hearing normal.  Cardiovascular: Peripheral pulses 2+.   Pulmonary/Chest: Respirations non-labored.  Musculoskeletal: Normal gait. Left knee: normal range of motion. Can squat and rise without difficulty. Mild generalized swelling compared to right knee. No erythema of left knee.  Neurological: Alert and oriented to person, place, and time. Muscle tone normal. Coordination normal. Light touch and sensation normal.   Skin: No rashes or lesions. Warm, dry, normal turgor.  Psychiatric: Normal mood and affect. Behavior is normal. Judgment and thought content normal.       Assessment / Plan:    1. Left knee injury, subsequent encounter    2. Cellulitis of knee, left      Discussed with him DDX, management options, and risks, benefits, and alternatives to treatment plan agreed upon.    Full duty.    Discharged / MMI.  "

## 2019-11-30 NOTE — LETTER
[facility]  [facility] [place] [address]  Phone:  [phone] - Fax:  [phone]   [facility] Progress Report and Disability Certification  Date of Service: 11/30/2019   No Show:  No  Date / Time of Next Visit:     Claim Information   Patient Name: Vitaly Paulino  Claim Number:     Employer:   [employer] Date of Injury: 11/5/2019     Insurer / TPA: [employer]  ID / SSN:     Occupation:   Diagnosis: Diagnoses of Left knee injury, subsequent encounter and Cellulitis of knee, left were pertinent to this visit.    Medical Information   Related to Industrial Injury? Yes    Subjective Complaints:  DOI: 11/5/19. Compared to visit 11/23/19, left knee pain and swelling are improved. Still a little bit of swelling and occasional mild pain. Feels can do regular full duty and does not need to return.   Objective Findings: Left knee: normal range of motion. Can squat and rise without difficulty. Mild generalized swelling compared to right knee. No erythema of left knee.   Pre-Existing Condition(s):     Assessment:   Condition Improved    Status: Discharged /  MMI  Permanent Disability:No    Plan:      Diagnostics:      Comments:       Disability Information   Status: Released to Full Duty    From:     Through:   Restrictions are:     Physical Restrictions   Sitting:    Standing:    Stooping:    Bending:      Squatting:    Walking:    Climbing:    Pushing:      Pulling:    Other:    Reaching Above Shoulder (L):   Reaching Above Shoulder (R):       Reaching Below Shoulder (L):    Reaching Below Shoulder (R):      Not to exceed Weight Limits   Carrying(hrs):   Weight Limit(lb):   Lifting(hrs):   Weight  Limit(lb):     Comments:      Repetitive Actions   Hands: i.e. Fine Manipulations from Grasping:     Feet: i.e. Operating Foot Controls:     Driving / Operate Machinery:     Physician Name: Leidy Simmons M.D. Physician Signature: LEIDY Fowler  M.D. e-Signature: Dr. Lior Arroyo, Medical Director   Clinic Name / Location: [address] [place] [address] Clinic Phone Number: Dept: [phone]   Appointment Time: 9:20 Am Visit Start Time: 9:05 AM   Check-In Time:  9:04 Am Visit Discharge Time:  9:26 AM   Original-Treating Physician or Chiropractor    Page 2-Insurer/TPA    Page 3-Employer    Page 4-Employee

## 2020-10-28 ENCOUNTER — OFFICE VISIT (OUTPATIENT)
Dept: MEDICAL GROUP | Facility: PHYSICIAN GROUP | Age: 55
End: 2020-10-28
Payer: COMMERCIAL

## 2020-10-28 VITALS
OXYGEN SATURATION: 97 % | SYSTOLIC BLOOD PRESSURE: 140 MMHG | HEART RATE: 85 BPM | WEIGHT: 216 LBS | BODY MASS INDEX: 32.74 KG/M2 | DIASTOLIC BLOOD PRESSURE: 82 MMHG | HEIGHT: 68 IN | TEMPERATURE: 98.7 F

## 2020-10-28 DIAGNOSIS — Z13.6 SCREENING FOR CARDIOVASCULAR CONDITION: ICD-10-CM

## 2020-10-28 DIAGNOSIS — K21.9 CHRONIC GERD: ICD-10-CM

## 2020-10-28 DIAGNOSIS — R00.2 PALPITATION: ICD-10-CM

## 2020-10-28 DIAGNOSIS — R73.01 ELEVATED FASTING GLUCOSE: ICD-10-CM

## 2020-10-28 DIAGNOSIS — Z80.0 FAMILY HISTORY OF ESOPHAGEAL CANCER: ICD-10-CM

## 2020-10-28 PROCEDURE — 99213 OFFICE O/P EST LOW 20 MIN: CPT | Performed by: NURSE PRACTITIONER

## 2020-10-28 RX ORDER — METOPROLOL SUCCINATE 100 MG/1
100 TABLET, EXTENDED RELEASE ORAL DAILY
Qty: 90 TAB | Refills: 0 | Status: SHIPPED | OUTPATIENT
Start: 2020-10-28 | End: 2021-01-26

## 2020-10-28 RX ORDER — METOPROLOL SUCCINATE 25 MG/1
25 TABLET, EXTENDED RELEASE ORAL DAILY
Qty: 90 TAB | Refills: 0 | Status: SHIPPED | OUTPATIENT
Start: 2020-10-28 | End: 2021-01-26

## 2020-10-28 ASSESSMENT — PATIENT HEALTH QUESTIONNAIRE - PHQ9: CLINICAL INTERPRETATION OF PHQ2 SCORE: 0

## 2020-10-28 NOTE — ASSESSMENT & PLAN NOTE
Chronic health problem that is usually well controlled with metoprolol 100 mg daily.  About 3 weeks ago patient had a long episode of palpitations.  Had not had palpitations in over a year.  Denies shortness of breath or chest pain or lightheadedness during episode.  Discontinued caffeine and reports that palpitations have almost gone back to normal.  Does have occasional palpitations lasting less than a minute.

## 2020-10-29 NOTE — ASSESSMENT & PLAN NOTE
Family history of esophageal cancer in both father and brother.  Patient no longer drinking alcohol or smoking.  Takes omeprazole 20 mg twice a day.  Denies difficulty swallowing, epigastric pain, hoarseness.  Consider referral to GI.

## 2020-10-29 NOTE — PROGRESS NOTES
CC: Medication refill    HISTORY OF THE PRESENT ILLNESS: Patient is a 55 y.o. male. This pleasant patient is here today, accompanied by wife, for evaluation and management of the following health problems.    Health Maintenance: Patient declined flu vaccine, though we reviewed benefits of flu vaccine.  Encouraged him to wash his hands frequently and stay out of environments with people who are ill.        Palpitation   Chronic health problem that is usually well controlled with metoprolol 100 mg daily.  About 3 weeks ago patient had a long episode of palpitations.  Had not had palpitations in over a year.  Denies shortness of breath or chest pain or lightheadedness during episode.  Discontinued caffeine and reports that palpitations have almost gone back to normal.  Does have occasional palpitations lasting less than a minute.      Chronic GERD  This is a chronic health problem that is well controlled with current medications and lifestyle measures.  Taking omeprazole daily.  Denies abdominal pain, nausea, blood in stool, dark black stool.    Family history of esophageal cancer  Family history of esophageal cancer in both father and brother.  Patient no longer drinking alcohol or smoking.  Takes omeprazole 20 mg twice a day.  Denies difficulty swallowing, epigastric pain, hoarseness.  Consider referral to GI.      Allergies: Patient has no known allergies.    Current Outpatient Medications Ordered in Epic   Medication Sig Dispense Refill   • metoprolol SR (TOPROL XL) 100 MG TABLET SR 24 HR Take 1 Tab by mouth every day. Take with 25 mg to equal 125 mg a day.  Dose increase 90 Tab 0   • metoprolol SR (TOPROL XL) 25 MG TABLET SR 24 HR Take 1 Tab by mouth every day. Take with 100 mg tab to equal 125 mg a day.  Dose increase 90 Tab 0   • omeprazole (PRILOSEC) 20 MG delayed-release capsule Take 20 mg by mouth every day.     • aspirin 81 MG tablet Take 81 mg by mouth every day.     • Cyanocobalamin (B-12) 2500 MCG Tab  "Take  by mouth.     • Ascorbic Acid (VITAMIN C) 1000 MG Tab Take  by mouth.     • Multiple Vitamins-Minerals (MULTIVITAMIN PO) Take  by mouth.       No current Epic-ordered facility-administered medications on file.        History reviewed. No pertinent past medical history.    History reviewed. No pertinent surgical history.    Social History     Tobacco Use   • Smoking status: Former Smoker     Packs/day: 1.00     Years: 20.00     Pack years: 20.00     Types: Cigarettes     Quit date: 2006     Years since quittin.7   • Smokeless tobacco: Former User   Substance Use Topics   • Alcohol use: No   • Drug use: No       Family History   Problem Relation Age of Onset   • Cancer Mother         BCA    • Cancer Father         esophageal and stomach    • Cancer Brother         barretts, esophageal CA @ 61       ROS:   As in HPI, otherwise negative for chest pain, dyspnea, abdominal pain, dysuria, blood in stool, fever             Exam: /82 (BP Location: Left arm, Patient Position: Sitting, BP Cuff Size: Large adult)   Pulse 85   Temp 37.1 °C (98.7 °F) (Temporal)   Ht 1.727 m (5' 8\")   Wt 98 kg (216 lb)   SpO2 97%  Body mass index is 32.84 kg/m².    General: Alert, pleasant, well nourished, well developed male in NAD  HEENT: Normocephalic. Eyes conjunctiva clear lids without ptosis, pupils equal and reactive to light, ears normal shape and contour, canals are clear bilaterally, tympanic membranes are pearly gray with good light reflex.  Patient wearing mask..   Neck: Supple without bruit. Thyroid is not enlarged.  Pulmonary: Clear to ausculation.  Normal effort. No rales, ronchi, or wheezing.  Cardiovascular: Normal rate and rhythm without murmur. Carotid and radial pulses are intact and equal bilaterally.  No lower extremity edema.  Abdomen: Soft, nontender, nondistended. Normal bowel sounds. Liver and spleen are not palpable  Neurologic: Grossly nonfocal  Lymph: No cervical or supraclavicular lymph nodes " are palpable  Skin: Warm and dry.   Musculoskeletal: Normal gait.   Psych: Normal mood and affect. Alert and oriented. Judgment and insight is normal.    Please note that this dictation was created using voice recognition software. I have made every reasonable attempt to correct obvious errors, but I expect that there are errors of grammar and possibly content that I did not discover before finalizing the note.      Assessment/Plan  1. Palpitation  Patient having increased episode of palpitations.  Did discontinue caffeine, which seemed to help.  Still having occasional palpitations.  Pressure is mildly elevated today.  Will increase metoprolol SR to 125 mg daily.  Patient is agreeable to plan.  ER precautions reviewed with patient  - TSH WITH REFLEX TO FT4; Future  - metoprolol SR (TOPROL XL) 100 MG TABLET SR 24 HR; Take 1 Tab by mouth every day. Take with 25 mg to equal 125 mg a day.  Dose increase  Dispense: 90 Tab; Refill: 0  - metoprolol SR (TOPROL XL) 25 MG TABLET SR 24 HR; Take 1 Tab by mouth every day. Take with 100 mg tab to equal 125 mg a day.  Dose increase  Dispense: 90 Tab; Refill: 0    2. Elevated fasting glucose  Will notify patient of lab results.  - HEMOGLOBIN A1C; Future    3. Screening for cardiovascular condition  Will notify patient of lab results.  - Comp Metabolic Panel; Future  - ESTIMATED GFR; Future  - Lipid Profile; Future    4. Chronic GERD  Continue with omeprazole.  Continue to monitor.  Reviewed lifestyle measures..    5. Family history of esophageal cancer  Consider referral to gastroenterology.    Patient will return to clinic annually or sooner if needed and pending lab results.

## 2020-10-29 NOTE — ASSESSMENT & PLAN NOTE
This is a chronic health problem that is well controlled with current medications and lifestyle measures.  Taking omeprazole daily.  Denies abdominal pain, nausea, blood in stool, dark black stool.

## 2021-01-23 DIAGNOSIS — R00.2 PALPITATION: ICD-10-CM

## 2021-01-26 RX ORDER — METOPROLOL SUCCINATE 100 MG/1
TABLET, EXTENDED RELEASE ORAL
Qty: 90 TAB | Refills: 1 | Status: SHIPPED | OUTPATIENT
Start: 2021-01-26 | End: 2021-07-23 | Stop reason: SDUPTHER

## 2021-01-26 RX ORDER — METOPROLOL SUCCINATE 25 MG/1
TABLET, EXTENDED RELEASE ORAL
Qty: 90 TAB | Refills: 1 | Status: SHIPPED
Start: 2021-01-26 | End: 2021-07-23

## 2021-07-22 DIAGNOSIS — R00.2 PALPITATION: ICD-10-CM

## 2021-07-23 RX ORDER — METOPROLOL SUCCINATE 25 MG/1
TABLET, EXTENDED RELEASE ORAL
Qty: 90 TABLET | Refills: 0 | Status: SHIPPED | OUTPATIENT
Start: 2021-07-23 | End: 2021-08-06 | Stop reason: SDUPTHER

## 2021-07-23 RX ORDER — METOPROLOL SUCCINATE 100 MG/1
TABLET, EXTENDED RELEASE ORAL
Qty: 90 TABLET | Refills: 0 | Status: SHIPPED | OUTPATIENT
Start: 2021-07-23 | End: 2021-08-06 | Stop reason: SDUPTHER

## 2021-07-23 NOTE — TELEPHONE ENCOUNTER
Last seen by PCP 10/28/2020.   Vitals as of 10/28/20    Blood Pressure 140/82   Pt to make appt prior to more refills.

## 2021-08-06 ENCOUNTER — OFFICE VISIT (OUTPATIENT)
Dept: MEDICAL GROUP | Facility: PHYSICIAN GROUP | Age: 56
End: 2021-08-06
Payer: COMMERCIAL

## 2021-08-06 VITALS
HEART RATE: 77 BPM | HEIGHT: 69 IN | SYSTOLIC BLOOD PRESSURE: 130 MMHG | OXYGEN SATURATION: 96 % | RESPIRATION RATE: 16 BRPM | WEIGHT: 218.5 LBS | TEMPERATURE: 98.5 F | BODY MASS INDEX: 32.36 KG/M2 | DIASTOLIC BLOOD PRESSURE: 78 MMHG

## 2021-08-06 DIAGNOSIS — Z11.59 NEED FOR HEPATITIS C SCREENING TEST: ICD-10-CM

## 2021-08-06 DIAGNOSIS — R00.2 PALPITATION: ICD-10-CM

## 2021-08-06 DIAGNOSIS — R53.83 FATIGUE, UNSPECIFIED TYPE: ICD-10-CM

## 2021-08-06 DIAGNOSIS — R00.2 PALPITATIONS: ICD-10-CM

## 2021-08-06 DIAGNOSIS — Z12.5 SCREENING FOR PROSTATE CANCER: ICD-10-CM

## 2021-08-06 DIAGNOSIS — Z13.6 SCREENING FOR CARDIOVASCULAR CONDITION: ICD-10-CM

## 2021-08-06 DIAGNOSIS — R73.01 ELEVATED FASTING GLUCOSE: ICD-10-CM

## 2021-08-06 PROCEDURE — 99214 OFFICE O/P EST MOD 30 MIN: CPT | Performed by: NURSE PRACTITIONER

## 2021-08-06 RX ORDER — METOPROLOL SUCCINATE 25 MG/1
TABLET, EXTENDED RELEASE ORAL
Qty: 90 TABLET | Refills: 3 | Status: SHIPPED | OUTPATIENT
Start: 2021-08-06 | End: 2022-10-21 | Stop reason: SDUPTHER

## 2021-08-06 RX ORDER — METOPROLOL SUCCINATE 100 MG/1
TABLET, EXTENDED RELEASE ORAL
Qty: 90 TABLET | Refills: 3 | Status: SHIPPED | OUTPATIENT
Start: 2021-08-06 | End: 2021-08-24 | Stop reason: SDUPTHER

## 2021-08-06 RX ORDER — VITAMIN B COMPLEX
1000 TABLET ORAL DAILY
COMMUNITY

## 2021-08-06 ASSESSMENT — PATIENT HEALTH QUESTIONNAIRE - PHQ9: CLINICAL INTERPRETATION OF PHQ2 SCORE: 0

## 2021-08-06 NOTE — ASSESSMENT & PLAN NOTE
Patient reports 2 to 3-month onset of feeling tired and sleepy during the day.  Legs feel heavy at times.  Denies shortness of breath, chest pain, headache, night sweats, blood in stool, fever, nausea, poor appetite, unintentional weight loss.  Denies feeling down or hopeless.  Girlfriend reports patient snores quietly at night.  No witnessed apneic episodes.  Rarely drinks alcohol.  Does not smoke.  Not aerobically exercising, does have active job as a .  History of mildly elevated fasting glucose, has not been working on lifestyle measures.  Did not get labs done last year.

## 2021-08-06 NOTE — PROGRESS NOTES
CC: Fatigue, medication refill    HISTORY OF THE PRESENT ILLNESS: Patient is a 56 y.o. male. This pleasant patient is here today, accompanied by girlfriend, for evaluation and management of the following health problems.        Palpitation  This is a chronic health problem that is well controlled with current medications and lifestyle measures.  Taking metoprolol 125 mg daily.  Reports rare episodes of palpitations.  Will get some palpitations after drinking coffee, which she has discontinued.  The patient denies chest pain, shortness of breath, headache, vision changes, epistaxis, or dyspnea on exertion.        Fatigue  Patient reports 2 to 3-month onset of feeling tired and sleepy during the day.  Legs feel heavy at times.  Denies shortness of breath, chest pain, headache, night sweats, blood in stool, fever, nausea, poor appetite, unintentional weight loss.  Denies feeling down or hopeless.  Girlfriend reports patient snores quietly at night.  No witnessed apneic episodes.  Rarely drinks alcohol.  Does not smoke.  Not aerobically exercising, does have active job as a .  History of mildly elevated fasting glucose, has not been working on lifestyle measures.  Did not get labs done last year.      Allergies: Patient has no known allergies.    Current Outpatient Medications Ordered in Epic   Medication Sig Dispense Refill   • vitamin D (CHOLECALCIFEROL) 1000 Unit (25 mcg) Tab Take 1,000 Units by mouth every day.     • metoprolol SR (TOPROL XL) 100 MG TABLET SR 24 HR TAKE 1 TABLET BY MOUTH ONCE DAILY (TAKE  WITH  25MG  TAB  TO  EQUAL  125MG  DAILY-DOSE  INCREASE) 90 tablet 3   • metoprolol SR (TOPROL XL) 25 MG TABLET SR 24 HR TAKE 1 TABLET BY MOUTH ONCE DAILY ALONG  WITH  100MG  DOSE  FOR  A  TOTAL  OF  125MG 90 tablet 3   • omeprazole (PRILOSEC) 20 MG delayed-release capsule Take 20 mg by mouth every day.     • aspirin 81 MG tablet Take 81 mg by mouth every day.     • Cyanocobalamin (B-12) 2500 MCG Tab Take   "by mouth.     • Ascorbic Acid (VITAMIN C) 1000 MG Tab Take  by mouth.     • Multiple Vitamins-Minerals (MULTIVITAMIN PO) Take  by mouth.       No current Epic-ordered facility-administered medications on file.       History reviewed. No pertinent past medical history.    History reviewed. No pertinent surgical history.    Social History     Tobacco Use   • Smoking status: Former Smoker     Packs/day: 1.00     Years: 20.00     Pack years: 20.00     Types: Cigarettes     Quit date: 2/8/2006     Years since quitting: 15.5   • Smokeless tobacco: Former User   Substance Use Topics   • Alcohol use: No   • Drug use: No       Family History   Problem Relation Age of Onset   • Cancer Mother         BCA    • Cancer Father         esophageal and stomach    • Cancer Brother         barretts, esophageal CA @ 61       ROS:   As in HPI, otherwise negative for chest pain, dyspnea, abdominal pain, dysuria, blood in stool, fever          Exam: /78 (BP Location: Right arm, Patient Position: Sitting, BP Cuff Size: Adult)   Pulse 77   Temp 36.9 °C (98.5 °F) (Temporal)   Resp 16   Ht 1.74 m (5' 8.5\")   Wt 99.1 kg (218 lb 8 oz)   SpO2 96%  Body mass index is 32.74 kg/m².    General: Alert, pleasant, well nourished, well developed male in NAD  HEENT: Normocephalic. Eyes conjunctiva clear lids without ptosis, pupils equal and reactive to light, ears normal shape and contour, canals are clear bilaterally, tympanic membranes are pearly gray with good light reflex, nasal mucosa without erythema and drainage, oropharynx is without erythema, edema or exudates.   Neck: Supple without bruit. Thyroid is not enlarged.  Pulmonary: Clear to ausculation.  Normal effort. No rales, ronchi, or wheezing.  Cardiovascular: Normal rate and rhythm without murmur. Carotid and radial pulses are intact and equal bilaterally.  No lower extremity edema.  Abdomen: Soft, nontender, nondistended. Normal bowel sounds. Liver and spleen are not " palpable  Neurologic: Grossly nonfocal  Lymph: No cervical or supraclavicular lymph nodes are palpable  Skin: Warm and dry.    Psych: Normal mood and affect. Alert and oriented. Judgment and insight is normal.    Please note that this dictation was created using voice recognition software. I have made every reasonable attempt to correct obvious errors, but I expect that there are errors of grammar and possibly content that I did not discover before finalizing the note.      Assessment/Plan  1. Fatigue, unspecified type  We will get updated labs.  Discussed possible referral to sleep management.  Patient strongly declines at this time.  - CBC WITH DIFFERENTIAL; Future  - TSH WITH REFLEX TO FT4; Future  - VITAMIN D,25 HYDROXY; Future  - VITAMIN B12; Future  - Testosterone, Free & Total, Adult Male (w/SHBG); Future    2. Screening for prostate cancer  Reviewed pros and cons of PSA testing.  - PROSTATE SPECIFIC AG SCREENING; Future    3. Screening for cardiovascular condition  We will notify patient of results.  - Comp Metabolic Panel; Future  - ESTIMATED GFR; Future  - Lipid Profile; Future    4. Elevated fasting glucose  We will notify patient of results.  - HEMOGLOBIN A1C; Future    5. Need for hepatitis C screening test  Rationale reviewed.  - HCV Scrn ( [id number] [employer]); Future    6. Palpitation  Well-controlled.  Continue with metoprolol, no changes.  - metoprolol SR (TOPROL XL) 100 MG TABLET SR 24 HR; TAKE 1 TABLET BY MOUTH ONCE DAILY (TAKE  WITH  25MG  TAB  TO  EQUAL  125MG  DAILY-DOSE  INCREASE)  Dispense: 90 tablet; Refill: 3  - metoprolol SR (TOPROL XL) 25 MG TABLET SR 24 HR; TAKE 1 TABLET BY MOUTH ONCE DAILY ALONG  WITH  100MG  DOSE  FOR  A  TOTAL  OF  125MG  Dispense: 90 tablet; Refill: 3    Treatment plan pending lab results.

## 2021-08-06 NOTE — ASSESSMENT & PLAN NOTE
This is a chronic health problem that is well controlled with current medications and lifestyle measures.  Taking metoprolol 125 mg daily.  Reports rare episodes of palpitations.  Will get some palpitations after drinking coffee, which she has discontinued.  The patient denies chest pain, shortness of breath, headache, vision changes, epistaxis, or dyspnea on exertion.

## 2021-08-24 DIAGNOSIS — R00.2 PALPITATION: ICD-10-CM

## 2021-08-24 RX ORDER — METOPROLOL SUCCINATE 100 MG/1
TABLET, EXTENDED RELEASE ORAL
Qty: 90 TABLET | Refills: 3 | Status: SHIPPED | OUTPATIENT
Start: 2021-08-24 | End: 2022-08-08

## 2021-08-27 ENCOUNTER — HOSPITAL ENCOUNTER (OUTPATIENT)
Dept: LAB | Facility: MEDICAL CENTER | Age: 56
End: 2021-08-27
Attending: NURSE PRACTITIONER
Payer: COMMERCIAL

## 2021-08-27 DIAGNOSIS — Z13.6 SCREENING FOR CARDIOVASCULAR CONDITION: ICD-10-CM

## 2021-08-27 DIAGNOSIS — R73.01 ELEVATED FASTING GLUCOSE: ICD-10-CM

## 2021-08-27 DIAGNOSIS — R00.2 PALPITATION: ICD-10-CM

## 2021-08-27 DIAGNOSIS — R53.83 FATIGUE, UNSPECIFIED TYPE: ICD-10-CM

## 2021-08-27 DIAGNOSIS — Z11.59 NEED FOR HEPATITIS C SCREENING TEST: ICD-10-CM

## 2021-08-27 DIAGNOSIS — Z12.5 SCREENING FOR PROSTATE CANCER: ICD-10-CM

## 2021-08-27 LAB
25(OH)D3 SERPL-MCNC: 47 NG/ML (ref 30–100)
ALBUMIN SERPL BCP-MCNC: 4.4 G/DL (ref 3.2–4.9)
ALBUMIN/GLOB SERPL: 1.6 G/DL
ALP SERPL-CCNC: 51 U/L (ref 30–99)
ALT SERPL-CCNC: 47 U/L (ref 2–50)
ANION GAP SERPL CALC-SCNC: 10 MMOL/L (ref 7–16)
AST SERPL-CCNC: 19 U/L (ref 12–45)
BASOPHILS # BLD AUTO: 0.4 % (ref 0–1.8)
BASOPHILS # BLD: 0.03 K/UL (ref 0–0.12)
BILIRUB SERPL-MCNC: 0.5 MG/DL (ref 0.1–1.5)
BUN SERPL-MCNC: 22 MG/DL (ref 8–22)
CALCIUM SERPL-MCNC: 9.1 MG/DL (ref 8.5–10.5)
CHLORIDE SERPL-SCNC: 103 MMOL/L (ref 96–112)
CHOLEST SERPL-MCNC: 173 MG/DL (ref 100–199)
CO2 SERPL-SCNC: 25 MMOL/L (ref 20–33)
CREAT SERPL-MCNC: 0.87 MG/DL (ref 0.5–1.4)
EOSINOPHIL # BLD AUTO: 0.07 K/UL (ref 0–0.51)
EOSINOPHIL NFR BLD: 1 % (ref 0–6.9)
ERYTHROCYTE [DISTWIDTH] IN BLOOD BY AUTOMATED COUNT: 40.1 FL (ref 35.9–50)
EST. AVERAGE GLUCOSE BLD GHB EST-MCNC: 117 MG/DL
FASTING STATUS PATIENT QL REPORTED: NORMAL
GLOBULIN SER CALC-MCNC: 2.7 G/DL (ref 1.9–3.5)
GLUCOSE SERPL-MCNC: 107 MG/DL (ref 65–99)
HBA1C MFR BLD: 5.7 % (ref 4–5.6)
HCT VFR BLD AUTO: 47.6 % (ref 42–52)
HCV AB SER QL: NORMAL
HDLC SERPL-MCNC: 35 MG/DL
HGB BLD-MCNC: 16 G/DL (ref 14–18)
IMM GRANULOCYTES # BLD AUTO: 0.02 K/UL (ref 0–0.11)
IMM GRANULOCYTES NFR BLD AUTO: 0.3 % (ref 0–0.9)
LDLC SERPL CALC-MCNC: 107 MG/DL
LYMPHOCYTES # BLD AUTO: 2.4 K/UL (ref 1–4.8)
LYMPHOCYTES NFR BLD: 35.2 % (ref 22–41)
MCH RBC QN AUTO: 31.2 PG (ref 27–33)
MCHC RBC AUTO-ENTMCNC: 33.6 G/DL (ref 33.7–35.3)
MCV RBC AUTO: 92.8 FL (ref 81.4–97.8)
MONOCYTES # BLD AUTO: 0.48 K/UL (ref 0–0.85)
MONOCYTES NFR BLD AUTO: 7 % (ref 0–13.4)
NEUTROPHILS # BLD AUTO: 3.82 K/UL (ref 1.82–7.42)
NEUTROPHILS NFR BLD: 56.1 % (ref 44–72)
NRBC # BLD AUTO: 0 K/UL
NRBC BLD-RTO: 0 /100 WBC
PLATELET # BLD AUTO: 169 K/UL (ref 164–446)
PMV BLD AUTO: 12.2 FL (ref 9–12.9)
POTASSIUM SERPL-SCNC: 4.1 MMOL/L (ref 3.6–5.5)
PROT SERPL-MCNC: 7.1 G/DL (ref 6–8.2)
PSA SERPL-MCNC: 0.91 NG/ML (ref 0–4)
RBC # BLD AUTO: 5.13 M/UL (ref 4.7–6.1)
SODIUM SERPL-SCNC: 138 MMOL/L (ref 135–145)
TRIGL SERPL-MCNC: 154 MG/DL (ref 0–149)
TSH SERPL DL<=0.005 MIU/L-ACNC: 2.58 UIU/ML (ref 0.38–5.33)
VIT B12 SERPL-MCNC: 1680 PG/ML (ref 211–911)
WBC # BLD AUTO: 6.8 K/UL (ref 4.8–10.8)

## 2021-08-27 PROCEDURE — 84153 ASSAY OF PSA TOTAL: CPT

## 2021-08-27 PROCEDURE — 84402 ASSAY OF FREE TESTOSTERONE: CPT

## 2021-08-27 PROCEDURE — 80053 COMPREHEN METABOLIC PANEL: CPT

## 2021-08-27 PROCEDURE — 82607 VITAMIN B-12: CPT

## 2021-08-27 PROCEDURE — 80061 LIPID PANEL: CPT

## 2021-08-27 PROCEDURE — G0472 HEP C SCREEN HIGH RISK/OTHER: HCPCS

## 2021-08-27 PROCEDURE — 85025 COMPLETE CBC W/AUTO DIFF WBC: CPT

## 2021-08-27 PROCEDURE — 84270 ASSAY OF SEX HORMONE GLOBUL: CPT

## 2021-08-27 PROCEDURE — 36415 COLL VENOUS BLD VENIPUNCTURE: CPT

## 2021-08-27 PROCEDURE — 83036 HEMOGLOBIN GLYCOSYLATED A1C: CPT

## 2021-08-27 PROCEDURE — 84403 ASSAY OF TOTAL TESTOSTERONE: CPT

## 2021-08-27 PROCEDURE — 84443 ASSAY THYROID STIM HORMONE: CPT

## 2021-08-27 PROCEDURE — 82306 VITAMIN D 25 HYDROXY: CPT

## 2021-08-30 LAB
SHBG SERPL-SCNC: 29 NMOL/L (ref 11–80)
TESTOST FREE MFR SERPL: 1.9 % (ref 1.6–2.9)
TESTOST FREE SERPL-MCNC: 47 PG/ML (ref 47–244)
TESTOST SERPL-MCNC: 249 NG/DL (ref 300–890)

## 2022-08-01 ENCOUNTER — OFFICE VISIT (OUTPATIENT)
Dept: URGENT CARE | Facility: PHYSICIAN GROUP | Age: 57
End: 2022-08-01
Payer: COMMERCIAL

## 2022-08-01 VITALS
DIASTOLIC BLOOD PRESSURE: 80 MMHG | HEIGHT: 69 IN | BODY MASS INDEX: 33.69 KG/M2 | SYSTOLIC BLOOD PRESSURE: 122 MMHG | WEIGHT: 227.5 LBS | OXYGEN SATURATION: 98 % | TEMPERATURE: 98.6 F | HEART RATE: 79 BPM | RESPIRATION RATE: 16 BRPM

## 2022-08-01 DIAGNOSIS — R59.0 SUBMANDIBULAR LYMPHADENOPATHY: ICD-10-CM

## 2022-08-01 PROCEDURE — 99212 OFFICE O/P EST SF 10 MIN: CPT | Performed by: NURSE PRACTITIONER

## 2022-08-01 ASSESSMENT — ENCOUNTER SYMPTOMS
FEVER: 0
SORE THROAT: 0
SINUS PAIN: 0
DIAPHORESIS: 0
CHILLS: 0
COUGH: 0
WHEEZING: 0
BRUISES/BLEEDS EASILY: 0

## 2022-08-01 ASSESSMENT — FIBROSIS 4 INDEX: FIB4 SCORE: 0.97

## 2022-08-02 NOTE — PROGRESS NOTES
"Subjective     Vitaly Paulino is a 57 y.o. male who presents with Bump (Under his chin noticed it about a week ago)            Vitaly comes in today with a 1 week history of palpable \"lump\" just below the left mandible.  He denies any pain, redness, fever, chills, weight loss, night sweats, rash, URI symptoms or any broken or infected teeth.  He reports it is trending better but not resolved.        Review of Systems   Constitutional: Negative for chills, diaphoresis, fever and malaise/fatigue.   HENT: Negative for congestion, ear pain, sinus pain and sore throat.    Respiratory: Negative for cough and wheezing.    Endo/Heme/Allergies: Does not bruise/bleed easily.     Medications, Allergies, and current problem list reviewed today in Epic         Objective     Blood Pressure 122/80   Pulse 79   Temperature 37 °C (98.6 °F) (Temporal)   Respiration 16   Height 1.753 m (5' 9\")   Weight 103 kg (227 lb 8 oz)   Oxygen Saturation 98%   Body Mass Index 33.60 kg/m²      Physical Exam  Vitals reviewed.   Constitutional:       General: He is not in acute distress.     Appearance: Normal appearance. He is well-developed. He is not ill-appearing, toxic-appearing or diaphoretic.   HENT:      Right Ear: Tympanic membrane, ear canal and external ear normal. There is no impacted cerumen.      Left Ear: Tympanic membrane, ear canal and external ear normal. There is no impacted cerumen.      Nose: Nose normal. No congestion or rhinorrhea.      Mouth/Throat:      Mouth: Mucous membranes are moist.      Pharynx: No oropharyngeal exudate or posterior oropharyngeal erythema.      Comments: No broken teeth or oral lesions noted.  Upper dentures.    Eyes:      General: No scleral icterus.        Right eye: No discharge.         Left eye: No discharge.      Conjunctiva/sclera: Conjunctivae normal.   Neck:      Thyroid: No thyromegaly.      Vascular: No JVD.      Trachea: No tracheal deviation.      Comments: Left submandibular " lymphadenopathy: 2 cm, firm, non-tender.  No supraclavicular or cervical chain lymphadenopathy.  Cardiovascular:      Rate and Rhythm: Normal rate and regular rhythm.      Heart sounds: Normal heart sounds. No murmur heard.    No friction rub. No gallop.   Pulmonary:      Effort: Pulmonary effort is normal. No respiratory distress.      Breath sounds: Normal breath sounds. No stridor. No wheezing, rhonchi or rales.   Chest:      Chest wall: No tenderness.   Musculoskeletal:      Cervical back: Neck supple. No rigidity or tenderness.   Skin:     General: Skin is warm and dry.      Coloration: Skin is not jaundiced or pale.      Findings: No erythema or rash.   Neurological:      Mental Status: He is alert and oriented to person, place, and time.                             Assessment & Plan        1. Submandibular lymphadenopathy    Discussed exam findings with Vitaly.  Likely self-limited lymphadenopathy responding to some virus or inflammation.  Differential reviewed.  Follow up in 3 weeks if symptoms persist, sooner if worse, or if increasing in size, pain, fever, night sweats, or weight loss develop.  He verbalized understanding of and agreed with plan of care.

## 2022-08-07 DIAGNOSIS — R73.01 ELEVATED FASTING GLUCOSE: ICD-10-CM

## 2022-08-07 DIAGNOSIS — Z13.6 SCREENING FOR CARDIOVASCULAR CONDITION: ICD-10-CM

## 2022-08-07 DIAGNOSIS — Z12.5 SCREENING FOR MALIGNANT NEOPLASM OF PROSTATE: ICD-10-CM

## 2022-08-07 DIAGNOSIS — R00.2 PALPITATION: ICD-10-CM

## 2022-08-08 RX ORDER — METOPROLOL SUCCINATE 100 MG/1
TABLET, EXTENDED RELEASE ORAL
Qty: 90 TABLET | Refills: 0 | Status: SHIPPED | OUTPATIENT
Start: 2022-08-08 | End: 2022-11-08

## 2022-10-21 DIAGNOSIS — R00.2 PALPITATION: ICD-10-CM

## 2022-10-21 RX ORDER — METOPROLOL SUCCINATE 25 MG/1
TABLET, EXTENDED RELEASE ORAL
Qty: 90 TABLET | Refills: 0 | Status: SHIPPED | OUTPATIENT
Start: 2022-10-21 | End: 2022-11-23 | Stop reason: SDUPTHER

## 2022-10-22 NOTE — TELEPHONE ENCOUNTER
Prescription for 90-day supply sent to pharmacy.   Please inform patient needs appointment before further refills.

## 2022-10-22 NOTE — TELEPHONE ENCOUNTER
Received request via: Patient    Was the patient seen in the last year in this department? Yes    Does the patient have an active prescription (recently filled or refills available) for medication(s) requested? No    Last ov 8/1/22

## 2022-11-08 DIAGNOSIS — R00.2 PALPITATION: ICD-10-CM

## 2022-11-08 RX ORDER — METOPROLOL SUCCINATE 100 MG/1
TABLET, EXTENDED RELEASE ORAL
Qty: 30 TABLET | Refills: 0 | Status: SHIPPED | OUTPATIENT
Start: 2022-11-08 | End: 2022-11-23 | Stop reason: SDUPTHER

## 2022-11-08 NOTE — TELEPHONE ENCOUNTER
Received request via: Pharmacy    Was the patient seen in the last year in this department? No    Does the patient have an active prescription (recently filled or refills available) for medication(s) requested? No    Follow up appt 11/23/22

## 2022-11-23 ENCOUNTER — OFFICE VISIT (OUTPATIENT)
Dept: MEDICAL GROUP | Facility: PHYSICIAN GROUP | Age: 57
End: 2022-11-23
Payer: COMMERCIAL

## 2022-11-23 VITALS
DIASTOLIC BLOOD PRESSURE: 80 MMHG | TEMPERATURE: 97.7 F | BODY MASS INDEX: 34.38 KG/M2 | WEIGHT: 232.1 LBS | HEIGHT: 69 IN | OXYGEN SATURATION: 96 % | SYSTOLIC BLOOD PRESSURE: 132 MMHG | RESPIRATION RATE: 16 BRPM | HEART RATE: 82 BPM

## 2022-11-23 DIAGNOSIS — R00.2 PALPITATION: ICD-10-CM

## 2022-11-23 DIAGNOSIS — R00.2 PALPITATIONS: ICD-10-CM

## 2022-11-23 PROCEDURE — 99213 OFFICE O/P EST LOW 20 MIN: CPT | Performed by: NURSE PRACTITIONER

## 2022-11-23 RX ORDER — METOPROLOL SUCCINATE 100 MG/1
TABLET, EXTENDED RELEASE ORAL
Qty: 30 TABLET | Refills: 0 | Status: SHIPPED | OUTPATIENT
Start: 2022-11-23 | End: 2023-01-04

## 2022-11-23 RX ORDER — METOPROLOL SUCCINATE 25 MG/1
TABLET, EXTENDED RELEASE ORAL
Qty: 90 TABLET | Refills: 0 | Status: SHIPPED | OUTPATIENT
Start: 2022-11-23 | End: 2023-11-06

## 2022-11-23 ASSESSMENT — PATIENT HEALTH QUESTIONNAIRE - PHQ9: CLINICAL INTERPRETATION OF PHQ2 SCORE: 0

## 2022-11-23 ASSESSMENT — FIBROSIS 4 INDEX: FIB4 SCORE: 0.97

## 2022-11-23 NOTE — NON-PROVIDER
"CC:  renew medication    HISTORY OF THE PRESENT ILLNESS: Patient is a 57 y.o. male. This pleasant patient is here today for evaluation and management of the following health problems.    Palpitations  Chronic problem managed with metoprolol 125 mg daily. Pt reports palpitations are controlled with medication, notes some palpitations after drinking coffee and than cuts back. Discussed cardiology referral, pt declines at this time, will consider in the future if symptoms change. Pt reports improvement to fatigue since last visit, \"feels old age\" discussed may help to decrease metoprolol. Discussed trying 100 mg of metoprolol and monitor palpitations symptoms. Pt denies dizziness/lightheadedness. Denies CP or shortness of breath. Pt didn't obtain labs due to financial cost, pt verbalized will get wellness labs in the upcoming month prior to subsequent refills of metoprolol.       No problem-specific Assessment & Plan notes found for this encounter.      Allergies: Patient has no known allergies.    Current Outpatient Medications Ordered in Epic   Medication Sig Dispense Refill    Multiple Vitamins-Minerals (ZINC PO) Take  by mouth.      metoprolol SR (TOPROL XL) 25 MG TABLET SR 24 HR TAKE 1 TABLET BY MOUTH ONCE DAILY ALONG  WITH  100MG  DOSE  FOR  A  TOTAL  OF  125MG 90 Tablet 0    metoprolol SR (TOPROL XL) 100 MG TABLET SR 24 HR TAKE ONE TABLET BY MOUTH ONE TIME DAILY WITH METOPROLOL SUCCINATE 25MG. 30 Tablet 0    vitamin D (CHOLECALCIFEROL) 1000 Unit (25 mcg) Tab Take 1,000 Units by mouth every day.      omeprazole (PRILOSEC) 20 MG delayed-release capsule Take 20 mg by mouth every day.      aspirin 81 MG tablet Take 81 mg by mouth every day.      Cyanocobalamin (B-12) 2500 MCG Tab Take  by mouth.      Ascorbic Acid (VITAMIN C) 1000 MG Tab Take  by mouth.      Multiple Vitamins-Minerals (MULTIVITAMIN PO) Take  by mouth.       No current Epic-ordered facility-administered medications on file.       History reviewed. No " "pertinent past medical history.    History reviewed. No pertinent surgical history.    Social History     Tobacco Use    Smoking status: Former     Packs/day: 1.00     Years: 20.00     Pack years: 20.00     Types: Cigarettes     Quit date: 2006     Years since quittin.8    Smokeless tobacco: Former   Substance Use Topics    Alcohol use: Yes     Comment: occ    Drug use: No       Family History   Problem Relation Age of Onset    Cancer Mother         BCA     Cancer Father         esophageal and stomach     Cancer Brother         barretts, esophageal CA @ 61       ROS: see HPI    - Constitutional: Negative for fatigue/generalized weakness.     - HEENT: Negative for headaches, vision changes, positive hearing changes-loss, ringing.    - Respiratory: Negative for cough, dyspnea, and wheezing.      - Cardiovascular: Negative for chest pain    - Gastrointestinal: Negative for heartburn, nausea, vomiting, abdominal pain, hematochezia, melena, diarrhea, constipation.     - Genitourinary: Negative for urinary hesitancy     Exam: /80 (BP Location: Left arm)   Pulse 82   Temp 36.5 °C (97.7 °F) (Temporal)   Resp 16   Ht 1.753 m (5' 9\")   Wt 105 kg (232 lb 1.6 oz)   SpO2 96%  Body mass index is 34.28 kg/m².    General: Well nourished, well developed male in NAD  HEENT: Normocephalic. Eyes conjunctiva clear lids without ptosis  Neck: Supple without bruit. Thyroid is not enlarged.  Pulmonary: Clear to ausculation.  Normal effort. No rales, ronchi, or wheezing.  Cardiovascular: Normal rate and rhythm without murmur.   Neurologic: Grossly nonfocal  Lymph: No cervical or supraclavicular lymph nodes are palpable  Skin: Warm and dry.  No obvious lesions.  Musculoskeletal: Normal gait.   Psych: Normal mood and affect. Alert and oriented. Judgment and insight is normal.      Assessment/Plan  1. Palpitation    - metoprolol SR (TOPROL XL) 25 MG TABLET SR 24 HR; TAKE 1 TABLET BY MOUTH ONCE DAILY ALONG  WITH  100MG  DOSE "  FOR  A  TOTAL  OF  125MG  Dispense: 90 Tablet; Refill: 0  - metoprolol SR (TOPROL XL) 100 MG TABLET SR 24 HR; TAKE ONE TABLET BY MOUTH ONE TIME DAILY WITH METOPROLOL SUCCINATE 25MG.  Dispense: 30 Tablet; Refill: 0

## 2022-11-25 NOTE — PROGRESS NOTES
"CC: Medication refill    HISTORY OF THE PRESENT ILLNESS: Patient is a 57 y.o. male. This pleasant patient is here today for evaluation and management of the following health problems.        Health Maintenance: Patient declined flu vaccine, though we reviewed benefits of flu vaccine.  Encouraged him to wash his hands frequently and stay out of environments with people who are ill.        Palpitation  Chronic problem managed with metoprolol 125 mg daily. Pt reports palpitations are controlled with medication, notes some palpitations after drinking coffee and than cuts back. Discussed cardiology referral, pt declines at this time, will consider in the future if symptoms change. Pt reports improvement to fatigue since last visit, \"feels old age\" discussed may help to decrease metoprolol. Discussed trying 100 mg of metoprolol and monitor palpitations symptoms. Pt denies dizziness/lightheadedness. Denies CP or shortness of breath. Pt didn't obtain labs due to financial cost, pt verbalized will get wellness labs in the upcoming month prior to subsequent refills of metoprolol.    Allergies: Patient has no known allergies.    Current Outpatient Medications Ordered in Epic   Medication Sig Dispense Refill    Multiple Vitamins-Minerals (ZINC PO) Take  by mouth.      metoprolol SR (TOPROL XL) 25 MG TABLET SR 24 HR TAKE 1 TABLET BY MOUTH ONCE DAILY ALONG  WITH  100MG  DOSE  FOR  A  TOTAL  OF  125MG 90 Tablet 0    metoprolol SR (TOPROL XL) 100 MG TABLET SR 24 HR TAKE ONE TABLET BY MOUTH ONE TIME DAILY WITH METOPROLOL SUCCINATE 25MG. 30 Tablet 0    vitamin D (CHOLECALCIFEROL) 1000 Unit (25 mcg) Tab Take 1,000 Units by mouth every day.      omeprazole (PRILOSEC) 20 MG delayed-release capsule Take 20 mg by mouth every day.      aspirin 81 MG tablet Take 81 mg by mouth every day.      Cyanocobalamin (B-12) 2500 MCG Tab Take  by mouth.      Ascorbic Acid (VITAMIN C) 1000 MG Tab Take  by mouth.      Multiple Vitamins-Minerals " "(MULTIVITAMIN PO) Take  by mouth.       No current Epic-ordered facility-administered medications on file.       History reviewed. No pertinent past medical history.    History reviewed. No pertinent surgical history.    Social History     Tobacco Use    Smoking status: Former     Packs/day: 1.00     Years: 20.00     Pack years: 20.00     Types: Cigarettes     Quit date: 2006     Years since quittin.8    Smokeless tobacco: Former   Substance Use Topics    Alcohol use: Yes     Comment: occ    Drug use: No       Family History   Problem Relation Age of Onset    Cancer Mother         BCA     Cancer Father         esophageal and stomach     Cancer Brother         barretts, esophageal CA @ 61       ROS:    As in HPI, otherwise negative for chest pain, dyspnea, abdominal pain, dysuria, blood in stool, fever           Exam: /80 (BP Location: Left arm)   Pulse 82   Temp 36.5 °C (97.7 °F) (Temporal)   Resp 16   Ht 1.753 m (5' 9\")   Wt 105 kg (232 lb 1.6 oz)   SpO2 96%  Body mass index is 34.28 kg/m².    General: Alert, pleasant, well nourished, well developed male in NAD  HEENT: Normocephalic. Eyes conjunctiva clear lids without ptosis, pupils equal and reactive to light, ears normal shape and contour, canals are clear bilaterally, tympanic membranes are pearly gray with good light reflex, nasal mucosa without erythema and drainage, oropharynx is without erythema, edema or exudates.   Neck: Supple without bruit. Thyroid is not enlarged.  Pulmonary: Clear to ausculation.  Normal effort. No rales, ronchi, or wheezing.  Cardiovascular: Normal rate and rhythm without murmur. Carotid and radial pulses are intact and equal bilaterally.  No lower extremity edema.  Abdomen: Soft, nontender, nondistended. Normal bowel sounds. Liver and spleen are not palpable  Neurologic: Grossly nonfocal  Lymph: No cervical or supraclavicular lymph nodes are palpable  Skin: Warm and dry.    Musculoskeletal: Normal gait.   Psych: " Normal mood and affect. Alert and oriented. Judgment and insight is normal.    Please note that this dictation was created using voice recognition software. I have made every reasonable attempt to correct obvious errors, but I expect that there are errors of grammar and possibly content that I did not discover before finalizing the note.      Assessment/Plan  1. Palpitation  Well-controlled with metoprolol.  Consider decreasing to 100 mg from 125 mg to see if lesser dose is helping prevent palpitations.  Patient will go back to 125 mg daily if palpitations occur more frequently.  We will get updated labs prior to further refills.  Explained rationale for lab review.  Declined referral to cardiology.    - metoprolol SR (TOPROL XL) 25 MG TABLET SR 24 HR; TAKE 1 TABLET BY MOUTH ONCE DAILY ALONG  WITH  100MG  DOSE  FOR  A  TOTAL  OF  125MG  Dispense: 90 Tablet; Refill: 0  - metoprolol SR (TOPROL XL) 100 MG TABLET SR 24 HR; TAKE ONE TABLET BY MOUTH ONE TIME DAILY WITH METOPROLOL SUCCINATE 25MG.  Dispense: 30 Tablet; Refill: 0    2. Palpitation      Patient will return to clinic annually or sooner if needed and pending lab results.    Patient seen and evaluated with FNP student, concur with documentation and findings, assessment and plan.    Please see additional documentation/note.

## 2022-11-25 NOTE — ASSESSMENT & PLAN NOTE
"Chronic problem managed with metoprolol 125 mg daily. Pt reports palpitations are controlled with medication, notes some palpitations after drinking coffee and than cuts back. Discussed cardiology referral, pt declines at this time, will consider in the future if symptoms change. Pt reports improvement to fatigue since last visit, \"feels old age\" discussed may help to decrease metoprolol. Discussed trying 100 mg of metoprolol and monitor palpitations symptoms. Pt denies dizziness/lightheadedness. Denies CP or shortness of breath. Pt didn't obtain labs due to financial cost, pt verbalized will get wellness labs in the upcoming month prior to subsequent refills of metoprolol.  "

## 2023-01-04 DIAGNOSIS — R00.2 PALPITATION: ICD-10-CM

## 2023-01-04 RX ORDER — METOPROLOL SUCCINATE 100 MG/1
TABLET, EXTENDED RELEASE ORAL
Qty: 90 TABLET | Refills: 3 | Status: SHIPPED | OUTPATIENT
Start: 2023-01-04 | End: 2023-11-23 | Stop reason: SDUPTHER

## 2023-01-04 NOTE — TELEPHONE ENCOUNTER
Last ov 11/23/22    Received request via: Pharmacy    Was the patient seen in the last year in this department? Yes    Does the patient have an active prescription (recently filled or refills available) for medication(s) requested? No    Does the patient have [facility] and need 100 day supply (blood pressure, diabetes and cholesterol meds only)? Patient does not have SCP

## 2023-11-06 ENCOUNTER — OCCUPATIONAL MEDICINE (OUTPATIENT)
Dept: URGENT CARE | Facility: PHYSICIAN GROUP | Age: 58
End: 2023-11-06
Payer: OTHER MISCELLANEOUS

## 2023-11-06 VITALS
HEART RATE: 105 BPM | RESPIRATION RATE: 16 BRPM | TEMPERATURE: 97.8 F | SYSTOLIC BLOOD PRESSURE: 128 MMHG | DIASTOLIC BLOOD PRESSURE: 80 MMHG | WEIGHT: 226 LBS | BODY MASS INDEX: 33.47 KG/M2 | HEIGHT: 69 IN | OXYGEN SATURATION: 95 %

## 2023-11-06 DIAGNOSIS — Z02.6 ENCOUNTER RELATED TO WORKER'S COMPENSATION CLAIM: ICD-10-CM

## 2023-11-06 DIAGNOSIS — L03.115 CELLULITIS OF RIGHT KNEE: ICD-10-CM

## 2023-11-06 DIAGNOSIS — M25.561 ACUTE PAIN OF RIGHT KNEE: ICD-10-CM

## 2023-11-06 PROCEDURE — 1125F AMNT PAIN NOTED PAIN PRSNT: CPT | Performed by: NURSE PRACTITIONER

## 2023-11-06 PROCEDURE — 99214 OFFICE O/P EST MOD 30 MIN: CPT | Performed by: NURSE PRACTITIONER

## 2023-11-06 PROCEDURE — 3079F DIAST BP 80-89 MM HG: CPT | Performed by: NURSE PRACTITIONER

## 2023-11-06 PROCEDURE — 3074F SYST BP LT 130 MM HG: CPT | Performed by: NURSE PRACTITIONER

## 2023-11-06 RX ORDER — SULFAMETHOXAZOLE AND TRIMETHOPRIM 800; 160 MG/1; MG/1
1 TABLET ORAL 2 TIMES DAILY
Qty: 20 TABLET | Refills: 0 | Status: SHIPPED | OUTPATIENT
Start: 2023-11-06 | End: 2023-11-16

## 2023-11-06 RX ORDER — KETOROLAC TROMETHAMINE 30 MG/ML
60 INJECTION, SOLUTION INTRAMUSCULAR; INTRAVENOUS ONCE
Status: COMPLETED | OUTPATIENT
Start: 2023-11-06 | End: 2023-11-06

## 2023-11-06 RX ORDER — HYDROCODONE BITARTRATE AND ACETAMINOPHEN 5; 325 MG/1; MG/1
TABLET ORAL
Qty: 15 TABLET | Refills: 0 | Status: SHIPPED | OUTPATIENT
Start: 2023-11-06 | End: 2023-11-09

## 2023-11-06 RX ADMIN — KETOROLAC TROMETHAMINE 60 MG: 30 INJECTION, SOLUTION INTRAMUSCULAR; INTRAVENOUS at 09:57

## 2023-11-06 ASSESSMENT — PAIN SCALES - GENERAL: PAINLEVEL: 8=MODERATE-SEVERE PAIN

## 2023-11-06 NOTE — LETTER
"    EMPLOYEE’S CLAIM FOR COMPENSATION/ REPORT OF INITIAL TREATMENT  FORM C-4  PLEASE TYPE OR PRINT    EMPLOYEE’S CLAIM - PROVIDE ALL INFORMATION REQUESTED   First Name                    DIAZ Haider Last Name  Jefferson Birthdate                    1965                Sex  [x]M  []F Claim Number (Insurer’s Use Only)     Home Address  [address] Age  58 y.o. Height  1.753 m (5' 9\") Weight  103 kg (226 lb) Social Security Number     [facility]  [address] Telephone  [phone] (home)    Mailing Address  [address] [place]  [address] Primary Language Spoken  English    INSURER  NA THIRD-PARTY   [employer]    Employee's Occupation (Job Title) When Injury or Occupational Disease Occurred      Employer's Name/Company Name    [employer]  Telephone  [phone]    Office Mail Address (Number and Street)  [address]     Date of Injury (if applicable) 11/2/2023               Hours Injury (if applicable)            am          230     pm Date Employer Notified  11/6/2023 Last Day of Work after Injury or Occupational Disease  11/3/2023 Supervisor to Whom Injury     Reported  [place]   Address or Location of Accident (if applicable)  Work [1]   What were you doing at the time of accident? (if applicable)  REPAIRING A HEATER CORE    How did this injury or occupational disease occur? (Be specific and answer in detail. Use additional sheet if necessary)  JUST KNEELING   If you believe that you have an occupational disease, when did you first have knowledge of the disability and its relationship to your employment?  NA Witnesses to the Accident (if applicable)  NA      Nature of Injury or Occupational Disease  Workers' Compensation  Part(s) of Body Injured or Affected  Knee (R) Defer Defer    I CERTIFY THAT THE ABOVE IS TRUE AND CORRECT TO T HE " BEST OF MY KNOWLEDGE AND THAT I HAVE PROVIDED THIS INFORMATION IN ORDER TO OBTAIN THE BENEFITS OF [place], OR [place]).  I HEREBY AUTHORIZE ANY PHYSICIAN, CHIROPRACTOR, SURGEON, PRACTITIONER OR ANY OTHER PERSON, ANY HOSPITAL, INCLUDING [facility] OR [facility], ANY  MEDICAL SERVICE ORGANIZATION, ANY INSURANCE COMPANY, OR OTHER INSTITUTION OR ORGANIZATION TO RELEASE TO EACH OTHER, ANY MEDICAL OR OTHER INFORMATION, INCLUDING BENEFITS PAID OR PAYABLE, PERTINENT TO THIS INJURY OR DISEASE, EXCEPT INFORMATION RELATIVE TO DIAGNOSIS, TREATMENT AND/OR COUNSELING FOR AIDS, PSYCHOLOGICAL CONDITIONS, ALCOHOL OR CONTROLLED SUBSTANCES, FOR WHICH I MUST GIVE SPECIFIC AUTHORIZATION.  A PHOTOSTAT OF THIS AUTHORIZATION SHALL BE VALID AS THE ORIGINAL.     Date  11/06/2023   Place [place]  Employee’s Original or  *Electronic Signature   THIS REPORT MUST BE COMPLETED AND MAILED WITHIN 3 WORKING DAYS OF TREATMENT   [facility]    Name of Facility  [place]   Date 11/6/2023 Diagnosis and Description of Injury or Occupational Disease  (Z02.6) Encounter related to worker's compensation claim  (M25.561) Acute pain of right knee  (L03.115) Cellulitis of right knee  Diagnoses of Encounter related to worker's compensation claim, Acute pain of right knee, and Cellulitis of right knee were pertinent to this visit. Is there evidence that the injured employee was under the influence of alcohol and/or another controlled substance at the time of accident?  []No  [] Yes (if yes, please explain)   Hour 9:24 AM    Comments:Unknown   Treatment: Work restrictions include no pushing, pulling, squatting, stooping, no walking or standing greater than 2 hours.  Recommended cryotherapy and elevation.  Patient was started on Bactrim for cellulitis.  Patient is given short course of Norco to help with pain.  Side effects to  medication were discussed as well as addictive nature.  Consent form signed.  Follow-up in clinic in 1 week.  Signs symptoms of worsening infection were discussed and when to follow-up in clinic versus emergency department.      Have you advised the patient to remain off work five days or more?   [] Yes Indicate dates: From   To    []No If no, is the injured employee capable of: [] full duty [] modified duty                                                             No  Yes  If modified duty, specify any limitations / restrictions:  Work restrictions include no pushing, pulling, squatting, stooping. no walking or standing greater than 2 hours.                                                                                                                                                                                                                                                                                                                                                                                                               X-Ray Findings:      From information given by the employee, together with medical evidence, can you directly connect this injury or occupational disease as job incurred?  []Yes   [] No Yes    Is additional medical care by a physician indicated? []Yes [] No  Yes    Do you know of any previous injury or disease contributing to this condition or occupational disease? []Yes [] No (Explain if yes)                          No   Date  11/6/2023 Print Health Care Provider’s Name  ANTONIO Carlos I certify that the employer’s copy of  this form was delivered to the employer on:   Address  [address] INSURER'S USE ONLY                       [place]  [address] Provider’s Tax ID Number  [id number]   Telephone  Dept: [phone]    Health Care Provider’s Original or Electronic Signature  e-SignSBRINA LICEA Degree (DO CAROLINE,  "IRVIN SWARTZ,APRN)  APRN  Choose (if applicable)      ORIGINAL - TREATING HEALTHCARE PROVIDER PAGE 2 - INSURER/TPA PAGE 3 - EMPLOYER PAGE 4 - EMPLOYEE             Form C-4 (rev.08/23)        BRIEF DESCRIPTION OF RIGHTS AND BENEFITS  (Pursuant to [place] 616C.050)    Notice of Injury or Occupational Disease (Incident Report Form C-1): If an injury or occupational disease (OD) arises out of and in the course of employment, you must provide written notice to your employer as soon as practicable, but no later than 7 days after the accident or OD. Your employer shall maintain a sufficient supply of the required forms.    Claim for Compensation (Form C-4): If medical treatment is sought, the form C-4 is available at the place of initial treatment. A completed \"Claim for Compensation\" (Form C-4) must be filed within 90 days after an accident or OD. The treating physician or chiropractor must, within 3 working days after treatment, complete and mail to the employer, the employer's insurer and third-party , the Claim for Compensation.    Medical Treatment: If you require medical treatment for your on-the-job injury or OD, you may be required to select a physician or chiropractor from a list provided by your workers’ compensation insurer, if it has contracted with an Organization for Managed Care (MCO) or Preferred Provider Organization (PPO) or providers of health care. If your employer has not entered into a contract with an MCO or PPO, you may select a physician or chiropractor from the Panel of Physicians and Chiropractors. Any medical costs related to your industrial injury or OD will be paid by your insurer.    Temporary Total Disability (TTD): If your doctor has certified that you are unable to work for a period of at least 5 consecutive days, or 5 cumulative days in a 20-day period, or places restrictions on you that your employer does not accommodate, you may be entitled to TTD compensation.    Temporary Partial " Disability (TPD): If the wage you receive upon reemployment is less than the compensation for TTD to which you are entitled, the insurer may be required to pay you TPD compensation to make up the difference. TPD can only be paid for a maximum of 24 months.    Permanent Partial Disability (PPD): When your medical condition is stable and there is an indication of a PPD as a result of your injury or OD, within 30 days, your insurer must arrange for an evaluation by a rating physician or chiropractor to determine the degree of your PPD. The amount of your PPD award depends on the date of injury, the results of the PPD evaluation, your age and wage.    Permanent Total Disability (PTD): If you are medically certified by a treating physician or chiropractor as permanently and totally disabled and have been granted a PTD status by your insurer, you are entitled to receive monthly benefits not to exceed 66 2/3% of your average monthly wage. The amount of your PTD payments is subject to reduction if you previously received a lump-sum PPD award.    Vocational Rehabilitation Services: You may be eligible for vocational rehabilitation services if you are unable to return to the job due to a permanent physical impairment or permanent restrictions as a result of your injury or occupational disease.    Transportation and Per Nichole Reimbursement: You may be eligible for travel expenses and per nichole associated with medical treatment.    Reopening: You may be able to reopen your claim if your condition worsens after claim closure.     Appeal Process: If you disagree with a written determination issued by the insurer or the insurer does not respond to your request, you may appeal to the Department of Administration, , by following the instructions contained in your determination letter. You must appeal the determination within 70 days from the date of the determination letter at [address], [address], [place]  [place], [place] [address], or [address], [address], [place], [place] [address]. If you disagree with the  decision, you may appeal to the Department of Administration, . You must file your appeal within 30 days from the date of the  decision letter at [address], [address], [place], [place] [address], or [address], [address], [place], [place] [address]. If you disagree with a decision of an , you may file a petition for judicial review with the District Court. You must do so within 30 days of the Appeal Officer’s decision. You may be represented by an  at your own expense or you may contact the [facility] for possible representation.    [place]  for Injured Workers (NAIW): If you disagree with a  decision, you may request that NAIW represent you without charge at an  Hearing. For information regarding denial of benefits, you may contact the [facility] at: [address], [address], [place] [address], [phone], or [address], [address], [place] [address], [phone]    To File a Complaint with the Division: If you wish to file a complaint with the  of the Division of Industrial Relations (DIR),  please contact the Workers’ Compensation Section, [address], [place], [place] [address], telephone [phone], or [address], [address], [place], [place] [address], telephone [phone].    For assistance with Workers’ Compensation Issues: You may contact the [facility] Office for Consumer Health Assistance, [address], [address], [place] [address], Toll Free 1-163.517.3182, Web site: http://[facility].[place].gov/Programs/TOÑITO E-mail: toñito@[facility].[place].gov              11/06/2023            __________________________________________________________________                                    _________________             Employee Name / Signature                                                                                                                            Date                                                                                                                                                                                                                              D-2 (rev. 10/20)

## 2023-11-06 NOTE — PROGRESS NOTES
"Subjective:     Vitaly Paulino is a 58 y.o. 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 Friday was worse- swelling and very hot, very painful)      DOI: 11/2/2023 CHRIS: JUST KNEELING  Visit #1-patient presents for evaluation of right knee pain after kneeling working in a car while at work.  Initially the right knee pain was mild and has progressively worsened.  Patient states that the pain level today is 8/10, sharp in nature and constant.  Pain is directly under his kneecap the pain is exacerbated with flexion, extension, and ambulation.  He states that his knee is swollen today, red, and hot.  He has been taking Tylenol arthritis every 6 hours, IcyHot, over-the-counter oils, and frankincense without any symptom relief.  Patient does have a's history of previous right knee injuries, surgeries, or infections.  Patient does have previous history of left knee cellulitis.  He states that symptoms are similar  Denies second job.    PMH:   No pertinent past medical history to this problem  MEDS:  Medications were reviewed in EMR  ALLERGIES:  Allergies were reviewed in EMR  SOCH[employer]:  Works as a   FH:   No pertinent family history to this problem       Objective:     /80   Pulse (!) 105   Temp 36.6 °C (97.8 °F) (Temporal)   Resp 16   Ht 1.753 m (5' 9\")   Wt 103 kg (226 lb)   SpO2 95%   BMI 33.37 kg/m²     Right Knee: Full ROM, full strength, TTP on patella and patellar tendon, edema Positive.  Erythema present, hot to the touch.  No pain reported posteriorly.  Varus/valgus stress neg, anterior/posterior drawer neg, Lachman's neg, Thessaly test neg.  Ambulates with limp      Assessment/Plan:       1. Encounter related to worker's compensation claim    2. Acute pain of right knee  - Consent for Opiate Prescription  - ketorolac (Toradol) injection 60 mg  - sulfamethoxazole-trimethoprim (BACTRIM DS) 800-160 MG tablet; Take 1 Tablet by mouth 2 times a day " for 10 days.  Dispense: 20 Tablet; Refill: 0  - HYDROcodone-acetaminophen (NORCO) 5-325 MG Tab per tablet; 1 TAB BY MOUTH EVERY 6 HOURS ONLY IF NEEDED FOR PAIN FOR UP TO 4 DAYS. MAY CAUSE DROWSINESS.  Dispense: 15 Tablet; Refill: 0    3. Cellulitis of right knee  - Consent for Opiate Prescription  - ketorolac (Toradol) injection 60 mg  - sulfamethoxazole-trimethoprim (BACTRIM DS) 800-160 MG tablet; Take 1 Tablet by mouth 2 times a day for 10 days.  Dispense: 20 Tablet; Refill: 0    Released to Restricted Duty FROM 11/6/2023 TO 11/14/2023  Work restrictions include no pushing, pulling, squatting, stooping, no walking or standing greater than 2 hours.  Offered x-rays however patient declined.  Toradol injection given in clinic.  Side effects of medication were discussed.  Patient is instructed not to take any other NSAIDs for at least 8 hours.  Recommended cryotherapy and elevation.  Patient was started on Bactrim for cellulitis.  Patient is given short course of Norco to help with pain.  Side effects to medication were discussed as well as addictive nature.  Consent form signed.  Follow-up in clinic in 1 week.  Signs symptoms of worsening infection were discussed and when to follow-up in clinic versus emergency department.       Differential diagnosis, natural history, supportive care, and indications for immediate follow-up discussed.    Approximately 35 minutes were spent in reviewing notes, preparing for visit, obtaining history, exam and evaluation, patient counseling/education and post visit documentation/orders.

## 2023-11-06 NOTE — LETTER
[facility]  [place] [address]  Phone:  [phone] - Fax:  [phone]   [facility] Progress Report and Disability Certification  Date of Service: 11/6/2023   No Show:  No  Date / Time of Next Visit: 11/14/2023   Claim Information   Patient Name: Vitaly Paulino  Claim Number:     Employer:   [employer] Date of Injury: 11/2/2023     Insurer / TPA: Tamra ID / SSN:     Occupation:   Diagnosis: Diagnoses of Encounter related to worker's compensation claim, Acute pain of right knee, and Cellulitis of right knee were pertinent to this visit.    Medical Information   Related to Industrial Injury? Yes    Subjective Complaints:  DOI: 11/2/2023 CHRIS: JUST KNEELING  Visit #1-patient presents for evaluation of right knee pain after kneeling working in a car while at work.  Initially the right knee pain was mild and has progressively worsened.  Patient states that the pain level today is 8/10, sharp in nature and constant.  Pain is directly under his kneecap the pain is exacerbated with flexion, extension, and ambulation.  He states that his knee is swollen today, red, and hot.  He has been taking Tylenol arthritis every 6 hours, IcyHot, over-the-counter oils, and frankincense without any symptom relief.  Patient does have a's history of previous right knee injuries, surgeries, or infections.  Patient does have previous history of left knee cellulitis.  He states that symptoms are similar  Denies second job.   Objective Findings: Right Knee: Full ROM, full strength, TTP on patella and patellar tendon, edema Positive.  Erythema present, hot to the touch.  No pain reported posteriorly.  Varus/valgus stress neg, anterior/posterior drawer neg, Lachman's neg, Thessaly test neg.  Ambulates with limp     Pre-Existing Condition(s):     Assessment:   Initial Visit    Status: Additional Care Required  Permanent Disability:No    Plan:  Medication    Diagnostics:      Comments:       Disability Information   Status: Released to Restricted Duty    From:  2023  Through: 2023 Restrictions are: Temporary   Physical Restrictions   Sitting:    Standing:  < or = to 2 hrs/day Stoopin hrs/day Bending:      Squattin hrs/day Walking:  < or = to 2 hrs/day Climbin hrs/day Pushin hrs/day   Pullin hrs/day Other:    Reaching Above Shoulder (L):   Reaching Above Shoulder (R):       Reaching Below Shoulder (L):    Reaching Below Shoulder (R):      Not to exceed Weight Limits   Carrying(hrs):   Weight Limit(lb):   Lifting(hrs):   Weight  Limit(lb):     Comments: Work restrictions include no pushing, pulling, squatting, stooping, no walking or standing greater than 2 hours.  Offered x-rays however patient declined.  Toradol injection given in clinic.  Side effects of medication were discussed.  Patient is instructed not to take any other NSAIDs for at least 8 hours.  Recommended cryotherapy and elevation.  Patient was started on Bactrim for cellulitis.  Patient is given short course of Norco to help with pain.  Side effects to medication were discussed as well as addictive nature.  Consent form signed.  Follow-up in clinic in 1 week.  Signs symptoms of worsening infection were discussed and when to follow-up in clinic versus emergency department.    Repetitive Actions   Hands: i.e. Fine Manipulations from Grasping:     Feet: i.e. Operating Foot Controls:     Driving / Operate Machinery:     Health Care Provider’s Original or Electronic Signature  ANTONIO Carlos Health Care Provider’s Original or Electronic Signature    Phill Tse DO MPH     Clinic Name / Location[facility] [address] [facility] [place] [address] Clinic Phone Number: Dept: [phone]   Appointment Time: 9:30 Am Visit Start Time: 9:24 AM   Check-In Time:  9:23 Am Visit Discharge Time:  10:10 am    Original-Treating Physician or  Chiropractor    Page 2-Insurer/TPA    Page 3-Employer    Page 4-Employee

## 2023-11-06 NOTE — PROGRESS NOTES
"Subjective:   Vitaly Paulino is a 58 y.o. 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 Friday was worse- swelling and very hot, very painful)      ***    ROS    Medications, Allergies, and current problem list reviewed today in Epic.     Objective:     /80   Pulse (!) 105   Temp 36.6 °C (97.8 °F) (Temporal)   Resp 16   Ht 1.753 m (5' 9\")   Wt 103 kg (226 lb)   SpO2 95%     Physical Exam    Assessment/Plan:     Diagnosis and associated orders:     No diagnosis found.   Comments/MDM:     ***         Differential diagnosis, natural history, supportive care, and indications for immediate follow-up discussed.    Advised the patient to follow-up with the primary care physician for recheck, reevaluation, and consideration of further management.    I personally reviewed prior external notes and test results pertinent to today's visit as well as additional imaging and testing completed in clinic today.     Please note that this dictation was created using voice recognition software. I have made a reasonable attempt to correct obvious errors, but I expect that there are errors of grammar and possibly content that I did not discover before finalizing the note.      "

## 2023-11-14 ENCOUNTER — OCCUPATIONAL MEDICINE (OUTPATIENT)
Dept: URGENT CARE | Facility: PHYSICIAN GROUP | Age: 58
End: 2023-11-14
Payer: OTHER MISCELLANEOUS

## 2023-11-14 VITALS
SYSTOLIC BLOOD PRESSURE: 130 MMHG | BODY MASS INDEX: 33.47 KG/M2 | HEART RATE: 95 BPM | WEIGHT: 226 LBS | DIASTOLIC BLOOD PRESSURE: 90 MMHG | RESPIRATION RATE: 16 BRPM | OXYGEN SATURATION: 96 % | TEMPERATURE: 97.2 F | HEIGHT: 69 IN

## 2023-11-14 DIAGNOSIS — M71.9 BURSITIS OF OTHER SITE: ICD-10-CM

## 2023-11-14 DIAGNOSIS — L03.115 CELLULITIS OF RIGHT KNEE: ICD-10-CM

## 2023-11-14 DIAGNOSIS — M25.561 ACUTE PAIN OF RIGHT KNEE: ICD-10-CM

## 2023-11-14 PROCEDURE — 3075F SYST BP GE 130 - 139MM HG: CPT

## 2023-11-14 PROCEDURE — 99213 OFFICE O/P EST LOW 20 MIN: CPT

## 2023-11-14 PROCEDURE — 1125F AMNT PAIN NOTED PAIN PRSNT: CPT

## 2023-11-14 PROCEDURE — 3080F DIAST BP >= 90 MM HG: CPT

## 2023-11-14 RX ORDER — METHYLPREDNISOLONE 4 MG/1
TABLET ORAL
Qty: 21 TABLET | Refills: 0 | Status: SHIPPED | OUTPATIENT
Start: 2023-11-14

## 2023-11-14 ASSESSMENT — PAIN SCALES - GENERAL: PAINLEVEL: 2=MINIMAL-SLIGHT

## 2023-11-14 NOTE — PROGRESS NOTES
"Subjective:     Vitaly Paulino is a 58 y.o. male who presents for Work-Related Injury (Wc fv)      Patient presents for his first WC FV today.  DOI: 11/2/2023 C/C: Right knee pain, swelling, redness  Patient presents for his first WC IV today.  Reports improvement in pain, swelling, redness.  He states he completed the course of Bactrim without complications.  He continues to have anterior knee swelling.  States there is pain which is worse with kneeling.  Pain is improved with rest, elevation.  He is employed in occupation which requires excessive kneeling.  Denies history of traumatic knee injuries, surgeries.  Denies radiation of pain, weakness, numbness/tingling sensation.  Denies buckling, locking sensation.  Denies second job    PMH:   No pertinent past medical history to this problem  MEDS:  Medications were reviewed in EMR  ALLERGIES:  Allergies were reviewed in EMR  SOCHX:  Works as a   FH:   No pertinent family history to this problem       Objective:     BP (!) 130/90   Pulse 95   Temp 36.2 °C (97.2 °F) (Temporal)   Resp 16   Ht 1.753 m (5' 9\")   Wt 103 kg (226 lb)   SpO2 96%   BMI 33.37 kg/m²     Physical Exam  Vitals and nursing note reviewed.   Constitutional:       Appearance: Normal appearance.   Cardiovascular:      Rate and Rhythm: Normal rate and regular rhythm.      Heart sounds: Normal heart sounds.   Pulmonary:      Effort: Pulmonary effort is normal.      Breath sounds: Normal breath sounds.   Abdominal:      General: Bowel sounds are normal.      Palpations: Abdomen is soft.   Musculoskeletal:         General: Swelling and tenderness present. No deformity or signs of injury.      Comments: Right anterior knee is edematous at the site of the patella bursa.  Mild erythema at the top of the knee.  No tenderness to palpation.  Mildly warm to the touch.  No obvious deformity, dislocation, crepitus, bony step-offs.  Range of motion is slightly reduced secondary to pain with flexion.  " Sensation is intact to light and sharp touch, cap refill less than 2 seconds, 2+ pedal pulses.  Skin is dry.  No rash.   Skin:     Capillary Refill: Capillary refill takes less than 2 seconds.      Findings: No erythema.   Neurological:      Mental Status: He is alert and oriented to person, place, and time.    Assessment/Plan:       1. Acute pain of right knee  - methylPREDNISolone (MEDROL DOSEPAK) 4 MG Tablet Therapy Pack; Follow schedule on package instructions.  Dispense: 21 Tablet; Refill: 0    2. Cellulitis of right knee    3. Bursitis of other site  - methylPREDNISolone (MEDROL DOSEPAK) 4 MG Tablet Therapy Pack; Follow schedule on package instructions.  Dispense: 21 Tablet; Refill: 0    Released to Restricted Duty FROM 11/14/2023 TO 11/17/2023  D39 updated today.  Suspect patella bursitis.   Work restrictions include no pushing, pulling, squatting, stooping, no walking or standing greater than 2 hours.  Treatment: Rest, ice packs, Ace wrap/knee brace.  Continue to use ibuprofen/Tylenol per package instructions.  Will trial Medrol Dosepak.  He may need steroid injection or aspiration if swelling continues.  Follow-up: 3 days       Differential diagnosis, natural history, supportive care, and indications for immediate follow-up discussed.

## 2023-11-14 NOTE — LETTER
[facility]  [address]  [place] [address]  Phone:  [phone] - Fax:  [phone]   [facility] Progress Report and Disability Certification  Date of Service: 11/14/2023   No Show:  No  Date / Time of Next Visit: 11/17/2023   Claim Information   Patient Name: Vitaly Paulino  Claim Number:     Employer:   [employer]  Date of Injury: 11/2/2023     Insurer / TPA: Misc Workers Comp  ID / SSN:     Occupation:   Diagnosis: Diagnoses of Acute pain of right knee, Cellulitis of right knee, and Bursitis of other site were pertinent to this visit.    Medical Information   Related to Industrial Injury? Yes    Subjective Complaints:  Patient presents for his first WC FV today.  DOI: 11/2/2023 C/C: Right knee pain, swelling, redness  Patient presents for his first WC IV today.  Reports improvement in pain, swelling, redness.  He states he completed the course of Bactrim without complications.  He continues to have anterior knee swelling.  States there is pain which is worse with kneeling.  Pain is improved with rest, elevation.  He is employed in occupation which requires excessive kneeling.  Denies history of traumatic knee injuries, surgeries.  Denies radiation of pain, weakness, numbness/tingling sensation.  Denies buckling, locking sensation.  Denies second job   Objective Findings: Physical Exam  Vitals and nursing note reviewed.   Constitutional:       Appearance: Normal appearance.   Cardiovascular:      Rate and Rhythm: Normal rate and regular rhythm.      Heart sounds: Normal heart sounds.   Pulmonary:      Effort: Pulmonary effort is normal.      Breath sounds: Normal breath sounds.   Abdominal:      General: Bowel sounds are normal.      Palpations: Abdomen is soft.   Musculoskeletal:         General: Swelling and tenderness present. No deformity or signs of injury.      Comments: Right anterior knee is edematous at the site of the patella  bursa.  Mild erythema at the top of the knee.  No tenderness to palpation.  Mildly warm to the touch.  No obvious deformity, dislocation, crepitus, bony step-offs.  Range of motion is slightly reduced secondary to pain with flexion.  Sensation is intact to light and sharp touch, cap refill less than 2 seconds, 2+ pedal pulses.  Skin is dry.  No rash.   Skin:     Capillary Refill: Capillary refill takes less than 2 seconds.      Findings: No erythema.   Neurological:      Mental Status: He is alert and oriented to person, place, and time.   Pre-Existing Condition(s):     Assessment:   Condition Improved    Status: Additional Care Required  Permanent Disability:No    Plan: Medication    Diagnostics:      Comments:       Disability Information   Status: Released to Restricted Duty    From:  11/14/2023  Through: 11/17/2023 Restrictions are: Temporary   Physical Restrictions   Sitting:    Standing:    Stooping:    Bending:      Squatting:    Walking:    Climbing:    Pushing:      Pulling:    Other:    Reaching Above Shoulder (L):   Reaching Above Shoulder (R):       Reaching Below Shoulder (L):    Reaching Below Shoulder (R):      Not to exceed Weight Limits   Carrying(hrs):   Weight Limit(lb):   Lifting(hrs):   Weight  Limit(lb):     Comments: D39 updated today.  Suspect patella bursitis.   Work restrictions include no pushing, pulling, squatting, stooping, no walking or standing greater than 2 hours.  Treatment: Rest, ice packs, Ace wrap/knee brace.  Continue to use ibuprofen/Tylenol per package instructions.  Will trial Medrol Dosepak.  He may need steroid injection or aspiration if swelling continues.  Follow-up: 3 days    Repetitive Actions   Hands: i.e. Fine Manipulations from Grasping:     Feet: i.e. Operating Foot Controls:     Driving / Operate Machinery:     Health Care Provider’s Original or Electronic Signature  ANTONIO Alvarez Health Care Provider’s Original or Electronic Signature    Phill  DO Carmencita MPH     Clinic Name / Location: [facility]  [place] [address] Clinic Phone Number: Dept: [phone]   Appointment Time: 9:30 Am Visit Start Time: 9:25 AM   Check-In Time:  9:25 Am Visit Discharge Time:  10:10 AM    Original-Treating Physician or Chiropractor    Page 2-Insurer/TPA    Page 3-Employer    Page 4-Employee

## 2023-11-17 ENCOUNTER — OCCUPATIONAL MEDICINE (OUTPATIENT)
Dept: URGENT CARE | Facility: PHYSICIAN GROUP | Age: 58
End: 2023-11-17
Payer: OTHER MISCELLANEOUS

## 2023-11-17 VITALS
WEIGHT: 226 LBS | RESPIRATION RATE: 16 BRPM | HEART RATE: 69 BPM | DIASTOLIC BLOOD PRESSURE: 80 MMHG | OXYGEN SATURATION: 100 % | TEMPERATURE: 97.3 F | HEIGHT: 69 IN | SYSTOLIC BLOOD PRESSURE: 112 MMHG | BODY MASS INDEX: 33.47 KG/M2

## 2023-11-17 DIAGNOSIS — M25.561 ACUTE PAIN OF RIGHT KNEE: ICD-10-CM

## 2023-11-17 DIAGNOSIS — M71.9 BURSITIS OF OTHER SITE: ICD-10-CM

## 2023-11-17 PROCEDURE — 3074F SYST BP LT 130 MM HG: CPT | Performed by: NURSE PRACTITIONER

## 2023-11-17 PROCEDURE — 99212 OFFICE O/P EST SF 10 MIN: CPT | Performed by: NURSE PRACTITIONER

## 2023-11-17 PROCEDURE — 3079F DIAST BP 80-89 MM HG: CPT | Performed by: NURSE PRACTITIONER

## 2023-11-17 NOTE — LETTER
[facility]  [address]  [place] [address]  Phone:  [phone] - Fax:  [phone]   [facility] Progress Report and Disability Certification  Date of Service: 11/17/2023   No Show:  No  Date / Time of Next Visit:     Claim Information   Patient Name: Vitaly Paulino  Claim Number:     Employer:   [employer]  Date of Injury: 11/2/2023     Insurer / TPA: Misc Workers Comp  ID / SSN:     Occupation:   Diagnosis: Diagnoses of Bursitis of other site and Acute pain of right knee were pertinent to this visit.    Medical Information   Related to Industrial Injury?   yes   Subjective Complaints:  Copied from previous visit  Patient presents for his first WC FV today.  DOI: 11/2/2023 C/C: Right knee pain, swelling, redness  Patient presents for his first WC IV today.  Reports improvement in pain, swelling, redness.  He states he completed the course of Bactrim without complications.  He continues to have anterior knee swelling.  States there is pain which is worse with kneeling.  Pain is improved with rest, elevation.  He is employed in occupation which requires excessive kneeling.  Denies history of traumatic knee injuries, surgeries.  Denies radiation of pain, weakness, numbness/tingling sensation.  Denies buckling, locking sensation.  Denies second job    HPI 11/17/2023  Visit #3-patient presents clinic for follow-up to cute right knee pain and bursitis.  At last visit, patient was placed on methylprednisolone Dosepak.  Patient states that it significantly helped the pain and swelling.  Today here he denies any pain, swelling, or redness.  He does have full range of motion of his right knee.  He states he is back to 100% normal.  He denies any locking, clicking, buckling, or pain with kneeling   Objective Findings: Right Knee: Full ROM, full strength, No TTP, edema, or erythema.  Varus/valgus stress negative, anterior/posterior drawer negative.   Negative for warmth sensation intact.  Ambulates with normal gait.   Pre-Existing Condition(s):     Assessment:   Condition Improved    Status: Discharged /  MMI  Permanent Disability:     Plan:      Diagnostics:      Comments:       Disability Information   Status: Released to Full Duty    From:     Through:   Restrictions are:     Physical Restrictions   Sitting:    Standing:    Stooping:    Bending:      Squatting:    Walking:    Climbing:    Pushing:      Pulling:    Other:    Reaching Above Shoulder (L):   Reaching Above Shoulder (R):       Reaching Below Shoulder (L):    Reaching Below Shoulder (R):      Not to exceed Weight Limits   Carrying(hrs):   Weight Limit(lb):   Lifting(hrs):   Weight  Limit(lb):     Comments: Patient released to full duty without restrictions.  Patient back to MMI.    Repetitive Actions   Hands: i.e. Fine Manipulations from Grasping:     Feet: i.e. Operating Foot Controls:     Driving / Operate Machinery:     Health Care Provider’s Original or Electronic Signature  ANTONIO Carlos Health Care Provider’s Original or Electronic Signature    Phill Tse DO MPH     Clinic Name / Location: [address] [address] Clinic Phone Number: Dept: [phone]   Appointment Time: 10:00 Am Visit Start Time: 9:58 AM   Check-In Time:  9:49 Am Visit Discharge Time:  10:25 am    Original-Treating Physician or Chiropractor    Page 2-Insurer/TPA    Page 3-Employer    Page 4-Employee

## 2023-11-17 NOTE — PROGRESS NOTES
"Subjective:     Vitaly Paulino is a 58 y.o. male who presents for Work-Related Injury (Follow up )      Copied from previous visit  Patient presents for his first WC FV today.  DOI: 11/2/2023 C/C: Right knee pain, swelling, redness  Patient presents for his first WC IV today.  Reports improvement in pain, swelling, redness.  He states he completed the course of Bactrim without complications.  He continues to have anterior knee swelling.  States there is pain which is worse with kneeling.  Pain is improved with rest, elevation.  He is employed in occupation which requires excessive kneeling.  Denies history of traumatic knee injuries, surgeries.  Denies radiation of pain, weakness, numbness/tingling sensation.  Denies buckling, locking sensation.  Denies second job    HPI 11/17/2023  Visit #3-patient presents clinic for follow-up to cute right knee pain and bursitis.  At last visit, patient was placed on methylprednisolone Dosepak.  Patient states that it significantly helped the pain and swelling.  Today here he denies any pain, swelling, or redness.  He does have full range of motion of his right knee.  He states he is back to 100% normal.  He denies any locking, clicking, buckling, or pain with kneeling    PMH:   No pertinent past medical history to this problem  MEDS:  Medications were reviewed in EMR  ALLERGIES:  Allergies were reviewed in EMR  SOCHX:  Works as a    FH:   No pertinent family history to this problem       Objective:     /80   Pulse 69   Temp 36.3 °C (97.3 °F) (Temporal)   Resp 16   Ht 1.753 m (5' 9\")   Wt 103 kg (226 lb)   SpO2 100%   BMI 33.37 kg/m²     Right Knee: Full ROM, full strength, No TTP, edema, or erythema.  Varus/valgus stress negative, anterior/posterior drawer negative.  Negative for warmth sensation intact.  Ambulates with normal gait.    Assessment/Plan:       1. Bursitis of other site    2. Acute pain of right knee    Released to Full Duty FROM   TO  "   Patient released to full duty without restrictions.  Patient back to [facility].       Differential diagnosis, natural history, supportive care, and indications for immediate follow-up discussed.

## 2023-11-23 ENCOUNTER — OFFICE VISIT (OUTPATIENT)
Dept: URGENT CARE | Facility: PHYSICIAN GROUP | Age: 58
End: 2023-11-23

## 2023-11-23 VITALS
BODY MASS INDEX: 33.47 KG/M2 | TEMPERATURE: 97.5 F | DIASTOLIC BLOOD PRESSURE: 90 MMHG | RESPIRATION RATE: 16 BRPM | HEIGHT: 69 IN | SYSTOLIC BLOOD PRESSURE: 122 MMHG | OXYGEN SATURATION: 98 % | HEART RATE: 91 BPM | WEIGHT: 226 LBS

## 2023-11-23 DIAGNOSIS — Z76.0 ENCOUNTER FOR MEDICATION REFILL: ICD-10-CM

## 2023-11-23 DIAGNOSIS — R00.2 PALPITATION: ICD-10-CM

## 2023-11-23 PROCEDURE — 3080F DIAST BP >= 90 MM HG: CPT | Performed by: NURSE PRACTITIONER

## 2023-11-23 PROCEDURE — 3074F SYST BP LT 130 MM HG: CPT | Performed by: NURSE PRACTITIONER

## 2023-11-23 PROCEDURE — 99214 OFFICE O/P EST MOD 30 MIN: CPT | Performed by: NURSE PRACTITIONER

## 2023-11-23 RX ORDER — METOPROLOL SUCCINATE 100 MG/1
100 TABLET, EXTENDED RELEASE ORAL DAILY
Qty: 90 TABLET | Refills: 0 | Status: SHIPPED | OUTPATIENT
Start: 2023-11-23

## 2023-11-23 NOTE — PROGRESS NOTES
"Subjective:   Vitaly Paulino is a 58 y.o. male who presents for Blood Pressure Problem (Refill bp meds)    Patient is a 58-year-old male presenting clinic today requesting refill of his metoprolol  mg once daily for treatment of hypertension and palpitations.  Patient states this is a chronic and controlled condition that he has been on the metoprolol for several years without any complications or side effects.  He denies any headaches, blurred vision, chest pain, palpitations, dizziness, or leg swelling today.  Last labs were  In 2021.  Patient's previous primary care provider is no longer with the clinic.  He states that [place] is [place] out of [place] and would like a 3-month supply until he can establish with new provider.      Medications, Allergies, and current problem list reviewed today in Epic.     Objective:     BP (!) 122/90   Pulse 91   Temp 36.4 °C (97.5 °F) (Temporal)   Resp 16   Ht 1.753 m (5' 9\")   Wt 103 kg (226 lb)   SpO2 98%     Physical Exam  Vitals reviewed.   Constitutional:       Appearance: Normal appearance.   HENT:      Head: Normocephalic.      Nose: Nose normal.      Mouth/Throat:      Mouth: Mucous membranes are moist.   Eyes:      Extraocular Movements: Extraocular movements intact.      Conjunctiva/sclera: Conjunctivae normal.      Pupils: Pupils are equal, round, and reactive to light.   Cardiovascular:      Rate and Rhythm: Normal rate and regular rhythm.      Pulses: Normal pulses.   Pulmonary:      Effort: Pulmonary effort is normal.      Breath sounds: Normal breath sounds.   Musculoskeletal:         General: Normal range of motion.      Cervical back: Normal range of motion and neck supple.      Right lower leg: No edema.      Left lower leg: No edema.   Skin:     General: Skin is warm and dry.   Neurological:      General: No focal deficit present.      Mental Status: He is alert and oriented to person, place, and time.   Psychiatric:         Mood and Affect: Mood normal.  "        Behavior: Behavior normal.         Assessment/Plan:     Diagnosis and associated orders:     1. Encounter for medication refill        2. Palpitation  metoprolol SR (TOPROL XL) 100 MG TABLET SR 24 HR         Comments/MDM:     This is a chronic and stable condition.  Reviewed most recent labs from August 2021 which does not indicate any renal or liver insufficiency.  Refill of metoprolol 100 mg once daily sent to pharmacy.  Side effects medication prescribed.  Discussed appropriate diet lifestyle modifications.  1. Encounter for medication refill    2. Palpitation  - metoprolol SR (TOPROL XL) 100 MG TABLET SR 24 HR; Take 1 Tablet by mouth every day.  Dispense: 90 Tablet; Refill: 0             Differential diagnosis, natural history, supportive care, and indications for immediate follow-up discussed.    Advised the patient to follow-up with the primary care physician for recheck, reevaluation, and consideration of further management.    I personally reviewed prior external notes and test results pertinent to today's visit as well as additional imaging and testing completed in clinic today.     Please note that this dictation was created using voice recognition software. I have made a reasonable attempt to correct obvious errors, but I expect that there are errors of grammar and possibly content that I did not discover before finalizing the note.

## 2024-10-16 NOTE — ASSESSMENT & PLAN NOTE
This is a chronic health problem that is well controlled with current medications and lifestyle measures.  Taking omeprazole.  Denies abdominal pain, epigastric pain, blood in stool, dark black stool.   16-Oct-2024 12:24